# Patient Record
Sex: FEMALE | Race: WHITE | NOT HISPANIC OR LATINO
[De-identification: names, ages, dates, MRNs, and addresses within clinical notes are randomized per-mention and may not be internally consistent; named-entity substitution may affect disease eponyms.]

---

## 2017-08-17 ENCOUNTER — APPOINTMENT (OUTPATIENT)
Dept: SURGICAL ONCOLOGY | Facility: CLINIC | Age: 52
End: 2017-08-17
Payer: COMMERCIAL

## 2017-08-17 VITALS
HEIGHT: 62 IN | RESPIRATION RATE: 16 BRPM | DIASTOLIC BLOOD PRESSURE: 71 MMHG | HEART RATE: 66 BPM | SYSTOLIC BLOOD PRESSURE: 111 MMHG | TEMPERATURE: 98.1 F

## 2017-08-17 PROCEDURE — 99213 OFFICE O/P EST LOW 20 MIN: CPT

## 2019-03-15 PROBLEM — Z00.00 ENCOUNTER FOR PREVENTIVE HEALTH EXAMINATION: Noted: 2019-03-15

## 2019-03-26 ENCOUNTER — TRANSCRIPTION ENCOUNTER (OUTPATIENT)
Age: 54
End: 2019-03-26

## 2019-03-27 ENCOUNTER — OUTPATIENT (OUTPATIENT)
Dept: OUTPATIENT SERVICES | Facility: HOSPITAL | Age: 54
LOS: 1 days | End: 2019-03-27
Payer: COMMERCIAL

## 2019-03-27 ENCOUNTER — RESULT REVIEW (OUTPATIENT)
Age: 54
End: 2019-03-27

## 2019-03-27 DIAGNOSIS — Z12.11 ENCOUNTER FOR SCREENING FOR MALIGNANT NEOPLASM OF COLON: ICD-10-CM

## 2019-03-27 LAB — HCG UR QL: NEGATIVE — SIGNIFICANT CHANGE UP

## 2019-03-27 PROCEDURE — 45380 COLONOSCOPY AND BIOPSY: CPT | Mod: PT

## 2019-03-27 PROCEDURE — 88305 TISSUE EXAM BY PATHOLOGIST: CPT

## 2019-03-27 PROCEDURE — 88305 TISSUE EXAM BY PATHOLOGIST: CPT | Mod: 26

## 2019-03-27 PROCEDURE — 81025 URINE PREGNANCY TEST: CPT

## 2019-03-28 LAB — SURGICAL PATHOLOGY STUDY: SIGNIFICANT CHANGE UP

## 2019-05-20 ENCOUNTER — TRANSCRIPTION ENCOUNTER (OUTPATIENT)
Age: 54
End: 2019-05-20

## 2019-07-09 ENCOUNTER — TRANSCRIPTION ENCOUNTER (OUTPATIENT)
Age: 54
End: 2019-07-09

## 2019-08-22 ENCOUNTER — APPOINTMENT (OUTPATIENT)
Dept: SURGICAL ONCOLOGY | Facility: CLINIC | Age: 54
End: 2019-08-22
Payer: COMMERCIAL

## 2019-08-22 VITALS
BODY MASS INDEX: 22.26 KG/M2 | DIASTOLIC BLOOD PRESSURE: 67 MMHG | HEART RATE: 91 BPM | HEIGHT: 62 IN | SYSTOLIC BLOOD PRESSURE: 98 MMHG | WEIGHT: 121 LBS | RESPIRATION RATE: 15 BRPM

## 2019-08-22 PROCEDURE — 99215 OFFICE O/P EST HI 40 MIN: CPT

## 2019-08-22 NOTE — ASSESSMENT
[FreeTextEntry1] : August 2019:\par Bilateral mammogram and sonogram MAR: BI-RADS 2\par \par Prescription provided for August 2020.\par \par Clinically doing well.\par \par Due to her pacemaker, she cannot have breast MRIs.\par \par We will see her annually, sooner if needed

## 2019-08-22 NOTE — REASON FOR VISIT
[Follow-Up Visit] : a follow-up visit for [Other: _____] : [unfilled] [FreeTextEntry2] : Right breast atypia

## 2019-08-22 NOTE — PHYSICAL EXAM
[Normal] : supple, no neck mass and thyroid not enlarged [Normal Neck Lymph Nodes] : normal neck lymph nodes  [Normal Supraclavicular Lymph Nodes] : normal supraclavicular lymph nodes [Normal Axillary Lymph Nodes] : normal axillary lymph nodes [Normal] : full range of motion and no deformities appreciated [de-identified] : Groins not examined [de-identified] : below

## 2019-08-22 NOTE — REVIEW OF SYSTEMS
[Negative] : Endocrine [FreeTextEntry5] : Hypertension [de-identified] : TIA [FreeTextEntry1] : increased risk of breast cancer

## 2019-10-24 ENCOUNTER — TRANSCRIPTION ENCOUNTER (OUTPATIENT)
Age: 54
End: 2019-10-24

## 2020-08-31 ENCOUNTER — APPOINTMENT (OUTPATIENT)
Dept: SURGICAL ONCOLOGY | Facility: CLINIC | Age: 55
End: 2020-08-31
Payer: COMMERCIAL

## 2020-08-31 VITALS
RESPIRATION RATE: 15 BRPM | HEART RATE: 75 BPM | WEIGHT: 118 LBS | BODY MASS INDEX: 21.71 KG/M2 | DIASTOLIC BLOOD PRESSURE: 71 MMHG | OXYGEN SATURATION: 96 % | HEIGHT: 62 IN | SYSTOLIC BLOOD PRESSURE: 108 MMHG

## 2020-08-31 PROCEDURE — 99214 OFFICE O/P EST MOD 30 MIN: CPT

## 2020-09-10 NOTE — REVIEW OF SYSTEMS
[Negative] : Endocrine [FreeTextEntry5] : Pacemaker [FreeTextEntry6] : Asthma [FreeTextEntry1] : History of atypia, increased risk of breast cancer

## 2020-09-10 NOTE — PHYSICAL EXAM
[Normal] : supple, no neck mass and thyroid not enlarged [Normal Neck Lymph Nodes] : normal neck lymph nodes  [Normal Supraclavicular Lymph Nodes] : normal supraclavicular lymph nodes [Normal Axillary Lymph Nodes] : normal axillary lymph nodes [Normal] : normal appearance, no rash, nodules, vesicles, ulcers, erythema [de-identified] : Groins not examined [de-identified] : below

## 2020-09-10 NOTE — HISTORY OF PRESENT ILLNESS
[de-identified] : Maria Elena Armstrong's sister\par \par 55 year-old lady with a history of RIGHT BREAST ATYPIA and radial scar excised in 2007.\par \par She was on tamoxifen for risk reduction, until it was stopped because she had a TIA.\par \par 2010, right breast MRI core biopsy was benign and concordant.\par \par 2010 she had a left duct excision.\par \par Due to insurance reasons, she transiently saw my associate JW.  (–), \par \par \par +FH:\par His sister had breast cancer at 32.\par Another sister had ovarian cancer.\par \par NO deleterious mutation on genetic testing.\par \par \par Menarche at 12.\par  3, para 2, first birth at 21.\par \par Sangeetha score 39\par \par Breast MRI contraindicated due to PACEMAKER\par \par  she had an aortic valve replacement with a bovine prosthesis.\par Postoperatively she required a pacemaker placement.\par She takes aspirin daily.\par \par Her internist is Dr.Adalbert SHARIF\par Was Dr Greg Ackerman\par \par \par Cardizem for hypertension.\par Her cardiologist is Dr. Fernando SMITH\par \par Known benign thyroid nodules.\par 2020 FNA biopsy was benign.\par Endocrine: Dr. Benedict SIMON\par \par Meloxicam and ibuprofen.\par Otology: Dr. Pepito CARBAJAL\par \par \par Her gynecologist is Dr. Osiel HAMM, 2020 was normal\par \par \par Spring 2019 colonoscopy: Dr. Krish Corral

## 2020-09-10 NOTE — ASSESSMENT
[FreeTextEntry1] : 2019:\par Bilateral mammogram and sonogram MAR: BI-RADS 2\par \par 2020 breast imaging at [facility]: Results pendin20: BI-RADS 2\par Prescription given for 2021\par \par Clinically doing well.\par \par Due to her pacemaker, she cannot have breast MRIs.\par \par We will see her annually, sooner if needed

## 2021-09-02 ENCOUNTER — APPOINTMENT (OUTPATIENT)
Dept: SURGICAL ONCOLOGY | Facility: CLINIC | Age: 56
End: 2021-09-02
Payer: COMMERCIAL

## 2021-09-02 VITALS
HEART RATE: 74 BPM | BODY MASS INDEX: 21.71 KG/M2 | RESPIRATION RATE: 16 BRPM | OXYGEN SATURATION: 97 % | SYSTOLIC BLOOD PRESSURE: 125 MMHG | WEIGHT: 118 LBS | HEIGHT: 62 IN | DIASTOLIC BLOOD PRESSURE: 77 MMHG

## 2021-09-02 DIAGNOSIS — N60.99 UNSPECIFIED BENIGN MAMMARY DYSPLASIA OF UNSPECIFIED BREAST: ICD-10-CM

## 2021-09-02 PROCEDURE — 99213 OFFICE O/P EST LOW 20 MIN: CPT

## 2021-09-02 NOTE — HISTORY OF PRESENT ILLNESS
[de-identified] : Maria Elena Armstrong's sister\par \par 56 year-old lady with a history of RIGHT BREAST ATYPIA and radial scar excised in 2007.\par \par She was on tamoxifen for risk reduction, until it was stopped because she had a TIA.\par \par 2010, right breast MRI core biopsy was benign and concordant.\par \par 2010 she had a left duct excision.\par \par Due to insurance reasons, she transiently saw my associate JW.  (–), \par \par \par +FH:\par His sister had breast cancer at 32.\par Another sister had ovarian cancer.\par \par NO deleterious mutation on genetic testing.\par \par \par Menarche at 12.\par  3, para 2, first birth at 21.\par \par Sangeetha score 39\par \par Breast MRI contraindicated due to PACEMAKER\par \par  she had an aortic valve replacement with a bovine prosthesis.\par Postoperatively she required a pacemaker placement.\par She takes aspirin daily.\par \par \par Her internist is Dr.Kamal SWAN\par Was Dr Greg Ackerman, then Dr Cristina Rojas\par \par \par Cardizem for hypertension.\par Her cardiologist is Dr. Fernando SMITH\par \par Known benign thyroid nodules.\par 2020 FNA biopsy was benign.\par Endocrine: Dr. Benedict SIMON\par \par + Rheumatoid arthritis.\par Meloxicam and ibuprofen.\par Rheumatology: Dr. Pepito CARBAJAL.\par She also has scleroderma.\par She was transiently treated with hydroxychloroquine, which she had to stop because of heartburn.\par \par \par Her gynecologist is Dr. Osiel HAMM, 2020 was normal\par \par \par Spring 2019 colonoscopy: Dr. Krish Corral

## 2021-09-02 NOTE — ASSESSMENT
[FreeTextEntry1] : \par \par August 2021 breast imaging at [facility]: BI-RADS 2\par Prescription given for August 2022\par \par Clinically doing well.\par \par Due to her pacemaker, she cannot have breast MRIs.\par \par We will see her annually, sooner if needed

## 2021-09-02 NOTE — REVIEW OF SYSTEMS
[Negative] : Integumentary [FreeTextEntry5] : Hypertension [de-identified] : Osteoarthritis [de-identified] : Benign thyroid nodules [FreeTextEntry1] : Increased risk of breast cancer

## 2021-09-02 NOTE — PHYSICAL EXAM
[Normal] : supple, no neck mass and thyroid not enlarged [Normal Neck Lymph Nodes] : normal neck lymph nodes  [Normal Supraclavicular Lymph Nodes] : normal supraclavicular lymph nodes [Normal Axillary Lymph Nodes] : normal axillary lymph nodes [Normal] : normal appearance, no rash, nodules, vesicles, ulcers, erythema [de-identified] : Groins not examined [de-identified] : below

## 2021-09-02 NOTE — REASON FOR VISIT
[Other: _____] : [unfilled] [FreeTextEntry2] : Increased risk of breast cancer, history of right breast atypia

## 2021-11-04 ENCOUNTER — OUTPATIENT (OUTPATIENT)
Dept: OUTPATIENT SERVICES | Facility: HOSPITAL | Age: 56
LOS: 1 days | End: 2021-11-04
Payer: COMMERCIAL

## 2021-11-04 DIAGNOSIS — I35.0 NONRHEUMATIC AORTIC (VALVE) STENOSIS: ICD-10-CM

## 2021-11-04 PROCEDURE — 93306 TTE W/DOPPLER COMPLETE: CPT | Mod: 26

## 2021-11-04 PROCEDURE — 93306 TTE W/DOPPLER COMPLETE: CPT

## 2021-11-23 ENCOUNTER — APPOINTMENT (OUTPATIENT)
Dept: CARDIOTHORACIC SURGERY | Facility: CLINIC | Age: 56
End: 2021-11-23
Payer: COMMERCIAL

## 2021-12-09 ENCOUNTER — APPOINTMENT (OUTPATIENT)
Dept: CARDIOTHORACIC SURGERY | Facility: CLINIC | Age: 56
End: 2021-12-09
Payer: COMMERCIAL

## 2021-12-09 VITALS
HEIGHT: 62 IN | DIASTOLIC BLOOD PRESSURE: 99 MMHG | TEMPERATURE: 98.3 F | BODY MASS INDEX: 21.9 KG/M2 | HEART RATE: 65 BPM | WEIGHT: 119 LBS | OXYGEN SATURATION: 98 % | RESPIRATION RATE: 14 BRPM | SYSTOLIC BLOOD PRESSURE: 182 MMHG

## 2021-12-09 DIAGNOSIS — I35.9 NONRHEUMATIC AORTIC VALVE DISORDER, UNSPECIFIED: ICD-10-CM

## 2021-12-09 DIAGNOSIS — I50.9 HEART FAILURE, UNSPECIFIED: ICD-10-CM

## 2021-12-09 DIAGNOSIS — T82.09XA OTHER MECHANICAL COMPLICATION OF HEART VALVE PROSTHESIS, INITIAL ENCOUNTER: ICD-10-CM

## 2021-12-09 DIAGNOSIS — Z95.2 PRESENCE OF PROSTHETIC HEART VALVE: ICD-10-CM

## 2021-12-09 PROCEDURE — 99204 OFFICE O/P NEW MOD 45 MIN: CPT

## 2021-12-09 RX ORDER — TOBRAMYCIN AND DEXAMETHASONE 3; 1 MG/ML; MG/ML
0.3-0.1 SUSPENSION/ DROPS OPHTHALMIC
Qty: 5 | Refills: 0 | Status: COMPLETED | COMMUNITY
Start: 2017-07-10 | End: 2021-12-09

## 2021-12-09 RX ORDER — TRAMADOL HYDROCHLORIDE 50 MG/1
50 TABLET, COATED ORAL
Qty: 30 | Refills: 0 | Status: COMPLETED | COMMUNITY
Start: 2017-08-10 | End: 2021-12-09

## 2021-12-09 RX ORDER — AMOXICILLIN AND CLAVULANATE POTASSIUM 875; 125 MG/1; MG/1
875-125 TABLET, COATED ORAL
Qty: 14 | Refills: 0 | Status: COMPLETED | COMMUNITY
Start: 2017-07-10 | End: 2021-12-09

## 2021-12-09 RX ORDER — PANTOPRAZOLE 40 MG/1
40 TABLET, DELAYED RELEASE ORAL
Qty: 1 | Refills: 3 | Status: ACTIVE | COMMUNITY
Start: 2021-12-09

## 2021-12-09 RX ORDER — CYCLOBENZAPRINE HYDROCHLORIDE 5 MG/1
5 TABLET, FILM COATED ORAL
Qty: 63 | Refills: 0 | Status: COMPLETED | COMMUNITY
Start: 2017-07-13 | End: 2021-12-09

## 2021-12-09 RX ORDER — CHOLECALCIFEROL (VITAMIN D3) 50 MCG
50 MCG TABLET ORAL WEEKLY
Refills: 0 | Status: ACTIVE | COMMUNITY
Start: 2021-12-09

## 2021-12-09 RX ORDER — AMOXICILLIN 500 MG/1
500 CAPSULE ORAL
Qty: 40 | Refills: 0 | Status: COMPLETED | COMMUNITY
Start: 2017-03-22 | End: 2021-12-09

## 2021-12-09 RX ORDER — MELOXICAM 7.5 MG/1
7.5 TABLET ORAL
Qty: 30 | Refills: 0 | Status: COMPLETED | COMMUNITY
Start: 2017-02-18 | End: 2021-12-09

## 2021-12-09 RX ORDER — UREA 40 G/100G
40 CREAM TOPICAL
Qty: 85 | Refills: 0 | Status: COMPLETED | COMMUNITY
Start: 2017-07-08 | End: 2021-12-09

## 2021-12-09 RX ORDER — IBUPROFEN 800 MG/1
800 TABLET, FILM COATED ORAL
Qty: 63 | Refills: 0 | Status: COMPLETED | COMMUNITY
Start: 2017-07-13 | End: 2021-12-09

## 2021-12-09 NOTE — ASSESSMENT
[FreeTextEntry1] : Lesvia is a 56 year old female who presents for surgical consultation for degeneration of the aortic bioprosthesis with pGr 41 mmHg, mGr 21 mmHg, DVI=0.27, LAYNE 0.8 cm².  She underwent an AVR (# 21 mm bovine pericardial valve) and aortic valve replacement utilizing a 28 Hemashield platinum graft on 3/16/2012. She has been followed in our aortic registry since her original surgery with serial imaging. She is currently working as a dental hygienist. She reports dyspnea on exertion when walking up an incline. She denies chest pain, palpitations, PND, orthopnea or syncope. \par \par I have reviewed the patient's medical records, diagnostic images during the time of this office consultation and have made the following recommendation. Review of the imaging shows his aortic pathology does require surgical intervention. Will refer to Structural heart Team as patient does not want to pursue redo aortic valve surgery. She became clearly emotional when discussion of valve replacement with surgical replacement was discussed. \par \par Plan\par 1. CT Structural \par 2.Structural heart Team consultation \par 3. Follow up with cardiologist and PCP.\par \par

## 2021-12-09 NOTE — PHYSICAL EXAM
[General Appearance - Alert] : alert [General Appearance - In No Acute Distress] : in no acute distress [Outer Ear] : the ears and nose were normal in appearance [Jugular Venous Distention Increased] : there was no jugular-venous distention [] : no respiratory distress [Respiration, Rhythm And Depth] : normal respiratory rhythm and effort [Prosthetic Aortic Valve] : prosthetic aortic valve heard [Surgical / Traumatic Scar Sternum] : a scar [Bowel Sounds] : normal bowel sounds [Abdomen Soft] : soft [Cervical Lymph Nodes Enlarged Posterior Bilaterally] : posterior cervical [Cervical Lymph Nodes Enlarged Anterior Bilaterally] : anterior cervical [No CVA Tenderness] : no ~M costovertebral angle tenderness [No Focal Deficits] : no focal deficits [Oriented To Time, Place, And Person] : oriented to person, place, and time [Impaired Insight] : insight and judgment were intact [Right Carotid Bruit] : no bruit heard over the right carotid [Left Carotid Bruit] : no bruit heard over the left carotid [Abnormal Walk] : normal gait

## 2021-12-09 NOTE — CONSULT LETTER
[FreeTextEntry2] : Dr. Benedict Robert\par [address] [facility]\par [place] [id number] [FreeTextEntry3] : Sincere Rowe MD\par  & \par \par Cardiovascular & Thoracic Surgery\par [facility] \par [address]\par [facility] [address]\par

## 2021-12-09 NOTE — HISTORY OF PRESENT ILLNESS
[FreeTextEntry1] : Lesvia is a 56 year old female who presents for surgical consultation for degeneration of the aortic bioprosthesis with pGr 41 mmHg, mGr 21 mmHg, DVI=0.27, LAYNE 0.8 cm².  She underwent an AVR (# 21 mm bovine pericardial valve) and aortic valve replacement utilizing a 28 Hemashield platinum graft on 3/16/2012. She is currently working as a dental hygienist. She reports dyspnea on exertion when walking up an incline. She denies chest pain, palpitations, PND, orthopnea or syncope.

## 2021-12-09 NOTE — REVIEW OF SYSTEMS
[Feeling Tired] : feeling tired [SOB on Exertion] : shortness of breath during exertion [Joint Stiffness] : joint stiffness [Negative] : Heme/Lymph [Chest Pain] : no chest pain [Palpitations] : no palpitations [Lower Ext Edema] : no lower extremity edema [Anxiety] : no anxiety

## 2021-12-21 ENCOUNTER — APPOINTMENT (OUTPATIENT)
Dept: CARDIOLOGY | Facility: CLINIC | Age: 56
End: 2021-12-21

## 2021-12-21 ENCOUNTER — RESULT REVIEW (OUTPATIENT)
Age: 56
End: 2021-12-21

## 2021-12-21 ENCOUNTER — OUTPATIENT (OUTPATIENT)
Dept: OUTPATIENT SERVICES | Facility: HOSPITAL | Age: 56
LOS: 1 days | End: 2021-12-21
Payer: COMMERCIAL

## 2021-12-21 DIAGNOSIS — T82.09XA OTHER MECHANICAL COMPLICATION OF HEART VALVE PROSTHESIS, INITIAL ENCOUNTER: ICD-10-CM

## 2021-12-21 DIAGNOSIS — Z00.00 ENCOUNTER FOR GENERAL ADULT MEDICAL EXAMINATION WITHOUT ABNORMAL FINDINGS: ICD-10-CM

## 2021-12-21 PROCEDURE — 75572 CT HRT W/3D IMAGE: CPT

## 2021-12-21 PROCEDURE — 75572 CT HRT W/3D IMAGE: CPT | Mod: 26

## 2022-01-04 LAB
ALBUMIN SERPL ELPH-MCNC: 4.9 G/DL
ALP BLD-CCNC: 94 U/L
ALT SERPL-CCNC: 32 U/L
ANION GAP SERPL CALC-SCNC: 13 MMOL/L
AST SERPL-CCNC: 22 U/L
BASOPHILS # BLD AUTO: 0.04 K/UL
BASOPHILS NFR BLD AUTO: 0.4 %
BILIRUB SERPL-MCNC: 0.5 MG/DL
BUN SERPL-MCNC: 12 MG/DL
CALCIUM SERPL-MCNC: 10.1 MG/DL
CHLORIDE SERPL-SCNC: 105 MMOL/L
CO2 SERPL-SCNC: 23 MMOL/L
CREAT SERPL-MCNC: 0.63 MG/DL
EOSINOPHIL # BLD AUTO: 0.08 K/UL
EOSINOPHIL NFR BLD AUTO: 0.9 %
GLUCOSE SERPL-MCNC: 86 MG/DL
HCT VFR BLD CALC: 49.5 %
HGB BLD-MCNC: 16.4 G/DL
IMM GRANULOCYTES NFR BLD AUTO: 0.2 %
LYMPHOCYTES # BLD AUTO: 2.46 K/UL
LYMPHOCYTES NFR BLD AUTO: 26.5 %
MAN DIFF?: NORMAL
MCHC RBC-ENTMCNC: 29.8 PG
MCHC RBC-ENTMCNC: 33.1 GM/DL
MCV RBC AUTO: 90 FL
MONOCYTES # BLD AUTO: 0.66 K/UL
MONOCYTES NFR BLD AUTO: 7.1 %
NEUTROPHILS # BLD AUTO: 6.03 K/UL
NEUTROPHILS NFR BLD AUTO: 64.9 %
NT-PROBNP SERPL-MCNC: 388 PG/ML
PLATELET # BLD AUTO: 246 K/UL
POTASSIUM SERPL-SCNC: 4.1 MMOL/L
PROT SERPL-MCNC: 6.9 G/DL
RBC # BLD: 5.5 M/UL
RBC # FLD: 12.5 %
SODIUM SERPL-SCNC: 141 MMOL/L
WBC # FLD AUTO: 9.29 K/UL

## 2022-01-07 ENCOUNTER — NON-APPOINTMENT (OUTPATIENT)
Age: 57
End: 2022-01-07

## 2022-01-09 ENCOUNTER — OUTPATIENT (OUTPATIENT)
Dept: OUTPATIENT SERVICES | Facility: HOSPITAL | Age: 57
LOS: 1 days | End: 2022-01-09
Payer: COMMERCIAL

## 2022-01-09 DIAGNOSIS — Z11.52 ENCOUNTER FOR SCREENING FOR COVID-19: ICD-10-CM

## 2022-01-09 PROCEDURE — U0003: CPT

## 2022-01-09 PROCEDURE — U0005: CPT

## 2022-01-09 PROCEDURE — C9803: CPT

## 2022-01-10 LAB — SARS-COV-2 RNA SPEC QL NAA+PROBE: DETECTED

## 2022-01-14 ENCOUNTER — OUTPATIENT (OUTPATIENT)
Dept: OUTPATIENT SERVICES | Facility: HOSPITAL | Age: 57
LOS: 1 days | End: 2022-01-14
Payer: COMMERCIAL

## 2022-01-14 VITALS
TEMPERATURE: 98 F | HEART RATE: 65 BPM | RESPIRATION RATE: 17 BRPM | HEIGHT: 62 IN | DIASTOLIC BLOOD PRESSURE: 77 MMHG | WEIGHT: 123.02 LBS | OXYGEN SATURATION: 97 % | SYSTOLIC BLOOD PRESSURE: 121 MMHG

## 2022-01-14 DIAGNOSIS — Z98.890 OTHER SPECIFIED POSTPROCEDURAL STATES: Chronic | ICD-10-CM

## 2022-01-14 DIAGNOSIS — T82.110A BREAKDOWN (MECHANICAL) OF CARDIAC ELECTRODE, INITIAL ENCOUNTER: ICD-10-CM

## 2022-01-14 DIAGNOSIS — U07.1 COVID-19: ICD-10-CM

## 2022-01-14 DIAGNOSIS — Z98.891 HISTORY OF UTERINE SCAR FROM PREVIOUS SURGERY: Chronic | ICD-10-CM

## 2022-01-14 DIAGNOSIS — Z86.79 PERSONAL HISTORY OF OTHER DISEASES OF THE CIRCULATORY SYSTEM: ICD-10-CM

## 2022-01-14 DIAGNOSIS — Z95.2 PRESENCE OF PROSTHETIC HEART VALVE: Chronic | ICD-10-CM

## 2022-01-14 DIAGNOSIS — Z01.818 ENCOUNTER FOR OTHER PREPROCEDURAL EXAMINATION: ICD-10-CM

## 2022-01-14 DIAGNOSIS — Z90.89 ACQUIRED ABSENCE OF OTHER ORGANS: Chronic | ICD-10-CM

## 2022-01-14 DIAGNOSIS — I44.2 ATRIOVENTRICULAR BLOCK, COMPLETE: ICD-10-CM

## 2022-01-14 DIAGNOSIS — O02.1 MISSED ABORTION: Chronic | ICD-10-CM

## 2022-01-14 PROCEDURE — 86850 RBC ANTIBODY SCREEN: CPT

## 2022-01-14 PROCEDURE — 71046 X-RAY EXAM CHEST 2 VIEWS: CPT | Mod: 26

## 2022-01-14 PROCEDURE — 83036 HEMOGLOBIN GLYCOSYLATED A1C: CPT

## 2022-01-14 PROCEDURE — G0463: CPT

## 2022-01-14 PROCEDURE — 80048 BASIC METABOLIC PNL TOTAL CA: CPT

## 2022-01-14 PROCEDURE — 86901 BLOOD TYPING SEROLOGIC RH(D): CPT

## 2022-01-14 PROCEDURE — 87641 MR-STAPH DNA AMP PROBE: CPT

## 2022-01-14 PROCEDURE — 85027 COMPLETE CBC AUTOMATED: CPT

## 2022-01-14 PROCEDURE — 71046 X-RAY EXAM CHEST 2 VIEWS: CPT

## 2022-01-14 PROCEDURE — 87640 STAPH A DNA AMP PROBE: CPT

## 2022-01-14 PROCEDURE — 36415 COLL VENOUS BLD VENIPUNCTURE: CPT

## 2022-01-14 PROCEDURE — 86900 BLOOD TYPING SEROLOGIC ABO: CPT

## 2022-01-14 RX ORDER — CHLORHEXIDINE GLUCONATE 213 G/1000ML
1 SOLUTION TOPICAL ONCE
Refills: 0 | Status: DISCONTINUED | OUTPATIENT
Start: 2022-01-18 | End: 2022-01-19

## 2022-01-14 RX ORDER — CEFUROXIME AXETIL 250 MG
1500 TABLET ORAL ONCE
Refills: 0 | Status: DISCONTINUED | OUTPATIENT
Start: 2022-01-18 | End: 2022-01-19

## 2022-01-14 RX ORDER — SODIUM CHLORIDE 9 MG/ML
3 INJECTION INTRAMUSCULAR; INTRAVENOUS; SUBCUTANEOUS EVERY 8 HOURS
Refills: 0 | Status: DISCONTINUED | OUTPATIENT
Start: 2022-01-18 | End: 2022-01-19

## 2022-01-14 RX ORDER — LIDOCAINE HCL 20 MG/ML
0.2 VIAL (ML) INJECTION ONCE
Refills: 0 | Status: DISCONTINUED | OUTPATIENT
Start: 2022-01-18 | End: 2022-01-19

## 2022-01-14 RX ORDER — APREPITANT 80 MG/1
40 CAPSULE ORAL ONCE
Refills: 0 | Status: COMPLETED | OUTPATIENT
Start: 2022-01-18 | End: 2022-01-18

## 2022-01-14 NOTE — H&P PST ADULT - NSANTHOSAYNRD_GEN_A_CORE
No. VIV screening performed.  STOP BANG Legend: 0-2 = LOW Risk; 3-4 = INTERMEDIATE Risk; 5-8 = HIGH Risk

## 2022-01-14 NOTE — H&P PST ADULT - FALL HARM RISK - UNIVERSAL INTERVENTIONS
Bed in lowest position, wheels locked, appropriate side rails in place/Call bell, personal items and telephone in reach/Instruct patient to call for assistance before getting out of bed or chair/Non-slip footwear when patient is out of bed/[place] to call system/Physically safe environment - no spills, clutter or unnecessary equipment/Purposeful Proactive Rounding/Room/bathroom lighting operational, light cord in reach

## 2022-01-14 NOTE — H&P PST ADULT - PROBLEM SELECTOR PLAN 3
griselda 1/9/2022  home quarantine   released from quarantine by VLADIMIR  scheduled 1/18/2022 for surgery   email sent to surgeon and infectious disease   pre op swab 1/16 Polly

## 2022-01-14 NOTE — H&P PST ADULT - ATTENDING COMMENTS
please see telehealth note for details of discussion. Had repeated conversation reviewing all options, procedures and risks. The patient wishes to proceed with extraction and MICRA placement

## 2022-01-14 NOTE — H&P PST ADULT - NSICDXPASTMEDICALHX_GEN_ALL_CORE_FT
PAST MEDICAL HISTORY:  2019 novel coronavirus disease (COVID-19) 1/9/2021 + PCR  s/s " like a cold"  lasting 4 days  tx  Z Pac  singulair cough syrup    Cardiac pacemaker March 2021   check 2  weeks leads need to be change    Chronic GERD     H/O rheumatoid arthritis not medication    Severe aortic valve stenosis      PAST MEDICAL HISTORY:  2019 novel coronavirus disease (COVID-19) 1/9/2021 + PCR  s/s " like a cold"  lasting 4 days  tx  Z Pac  singulair cough syrup    Cardiac pacemaker March 2021   check 2  weeks leads need to be change    Chronic GERD     H/O rheumatoid arthritis not medication    Severe aortic valve stenosis aortic valve disease dx in childhood had valve replaced 10 years ago   now needs TAVR    Tricuspid valve regurgitation      PAST MEDICAL HISTORY:  2019 novel coronavirus disease (COVID-19) 1/9/2021 + PCR  s/s " like a cold"  lasting 4 days  tx  Z Pac  singulair cough syrup    Breast ductal hyperplasia, atypical     Cardiac pacemaker March 2021   check 2  weeks leads need to be change    Chronic GERD     H/O rheumatoid arthritis not  on medication    Severe aortic valve stenosis aortic valve disease dx in childhood had valve replaced 10 years ago   now needs TAVR    Tricuspid valve regurgitation

## 2022-01-14 NOTE — H&P PST ADULT - NSICDXPASTSURGICALHX_GEN_ALL_CORE_FT
PAST SURGICAL HISTORY:  H/O breast biopsy multiple    H/O  section 1991    Missed ab d and c    S/p bilateral carpal tunnel release 3 years    S/P cardiac cath 2012    S/P tonsillectomy childhood     PAST SURGICAL HISTORY:  H/O aortic aneurysm repair 10 years ago    H/O aortic valve replacement 10 years ago bovine    H/O breast biopsy multiple    H/O  section 1991    Missed ab d and c    S/p bilateral carpal tunnel release 3 years    S/P cardiac cath 2012    S/P tonsillectomy childhood     PAST SURGICAL HISTORY:  H/O aortic aneurysm repair 10 years ago    H/O aortic valve replacement 10 years ago bovine    H/O breast biopsy multiple negative for cancer per pt    H/O  section 1991    Missed ab d and c    S/p bilateral carpal tunnel release 3 years    S/P cardiac cath 2012    S/P tonsillectomy childhood

## 2022-01-14 NOTE — H&P PST ADULT - MAMMOGRAM, RESULTS OF LAST, PROFILE
ASSESSMENT & PLAN    No problem-specific Assessment & Plan notes found for this encounter.      Lucy was seen today for adhd.    Diagnoses and all orders for this visit:    Attention deficit disorder, unspecified hyperactivity presence  -     Drug Abuse 1+, Urine    Mild episode of recurrent major depressive disorder (H)    Morbid obesity (H)    Attention deficit hyperactivity disorder (ADHD), predominantly inattentive type  -     dextroamphetamine-amphetamine (ADDERALL XR) 30 MG 24 hr capsule; Take 1 capsule (30 mg total) by mouth every morning.  -     dextroamphetamine-amphetamine (ADDERALL XR) 30 MG 24 hr capsule; Take 1 capsule (30 mg total) by mouth every morning.  -     dextroamphetamine-amphetamine (ADDERALL XR) 30 MG 24 hr capsule; Take 1 capsule (30 mg total) by mouth every morning.    Hypothyroidism, unspecified type  -     T4, Free  -     T4, Total  -     Thyroid Stimulating Hormone (TSH)  -     thyroid, pork, 30 mg Tab; Take 1 tablet (30 mg total) by mouth daily.    Thyroid nodule  -     US Thyroid; Future  -     thyroid, pork, 30 mg Tab; Take 1 tablet (30 mg total) by mouth daily.        Patient Instructions   [email or web address]  For coffee    8 hour eating window  16 hour fast window    Will plan to go to 30 mg [place]     Adderall sent       Instructions for Patients  Additional General Information About Corona Virus 19    Coronavirus - General Information:  The coronavirus infection starts within 14 days of an exposure.  Symptoms are those of a respiratory infection (such as fever, cough).   If you have not had symptoms by day 15, you should be safe from getting the coronavirus.     Coronavirus - Symptoms:   The coronavirus can cause a respiratory illness, such as bronchitis or pneumonia.  The most common symptoms are: cough, fever, and shortness of breath.   Other symptoms are: body aches, chills, diarrhea, fatigue, headache, runny nose, and sore throat      Coronavirus - Exposure Risk Factors:  Exposure to a person who has been diagnosed with coronavirus.  Travel from an area with recent local transmission of coronavirus.  The CDC (www.cdc.gov) has the most up-to-date list of where the coronavirus outbreak is occurring.    Coronavirus - Spreading:   The virus likely spreads through respiratory droplets produced when a person coughs or sneezes. These respiratory droplets can travel approximately 6 feet and can remain on surfaces.  Common disinfectants will kill the virus.  The CDC currently does not recommend healthy people wearing masks.    Coronavirus - Protect Yourself:   Avoid close contact with people known to have this new coronavirus infection.  Wash hands often with soap and water or alcohol-based hand .  Avoid touching the eyes, nose or mouth.     Thank you for limiting contact with others, wearing a simple mask to cover your cough, practice good hand hygiene habits and accessing our virtual services where possible to limit the spread of this virus.    For more information about COVID19 and options for caring for yourself at home, please visit the CDC website at https://www.cdc.gov/coronavirus/2019-ncov/about/steps-when-sick.html  For more options for care at [facility], please visit our website at [email or web address]     Read about corona Updates  Prepare by being informed  Stay safe and well    DanL            Return in about 6 months (around 9/12/2020).            CHIEF COMPLAINT: Lucyerum Kaur had concerns including ADHD (Medication check.).    [place]: 1.............. had concerns including ADHD (Medication check.).    1. Attention deficit disorder, unspecified hyperactivity presence    2. Mild episode of recurrent major depressive disorder (H)    3. Morbid obesity (H)    4. Attention deficit hyperactivity disorder (ADHD), predominantly inattentive type    5. Hypothyroidism, unspecified type    6.  Thyroid nodule          CC:             Why are you here today?                                   Medication Check - Adderall    Is it getting better / worse /same ?                                         N/a  WHAT IS IT LIKE in one word?:                                            n/a  HOW LONG is it ongoing: days, weeks,months?:                 n/a  What is it WORSE WITH activity? Eating? Movement? :      n/a  What makes it BETTER?:                                                      n/a  Severity:  0/10-10/10:Pain/Intesity                                         n/a      Is this NEW or Recurrent/Continued?                                    Recurrent        Patient is doing well  Last TSH 7.73  No side effects of medication    Continues use Adderall no side effects  Works from 7 PM to 7 AM  Has difficulty with meal prep    Asthma control test today 16  Currently on Flovent, cetirizine, Flonase and and albuterol    No hospitalizations    Declined a flu shot    Review of systems a recent lower gastrointestinal tract viral infection has gotten better          SUBJECTIVE:  Lucy Kaur is a 30 y.o. female                                SOCIAL: She  reports that she has quit smoking. She smoked 0.25 packs per day. She has never used smokeless tobacco. She reports that she does not drink alcohol or use drugs.    REVIEW OF SYSTEMS:   Family history not pertinent to chief complaint or presenting problem    Review of Systems:      Nervous System:  No new or change in headache, paresthesia or tremor                                  Ears: No new hearing loss or ringing in the ears    Eyes: No new blurring of vision, Double Vision                Nose: No new nosebleed or loss of smell    Mouth: No new mouth sores or  coated tongue    Throat: No new hoarseness or difficulty swallowing    Neck: No new neck pain or mass    Heart: No new chest pain, palpitation or irregular heartbeat.                  Lungs: No new  "shortness of breath, wheezing or hemoptysis.    Gastrointestinal: No new nausea or vomiting, melena or blood in stools.    Kidney/Bladder: No new polyuria, polydipsia, or hematuria.                             Genital/Sexual: No new Sex function Changes                                Skin: No new rash    Muscles/Joints/Bones: No changes in muscles / joint swelling     Review of systems otherwise negative as requested from patient, except   Those positive ROS outlined and discussed in [place].      VITALS:      Physical Exam:  TMs are clear  Oropharynx clear  Nasal mucosa is mildly congested  Thyroid palpable no appreciable nodules  She has some firmness bilateral lobes  Mild thyroid nodule  Unchanged in size  Reviewed her ultrasound  8 mm thyroid nodules in 2019  Follow yearly    Recent Results (from the past 240 hour(s))   T4, Free   Result Value Ref Range    Free T4 0.6 (L) 0.7 - 1.8 ng/dL   T4, Total   Result Value Ref Range    T4, Total 4.2 (L) 4.5 - 13.0 ug/dL   Thyroid Stimulating Hormone (TSH)   Result Value Ref Range    TSH 19.90 (H) 0.30 - 5.00 uIU/mL   Drug Abuse 1+, Urine   Result Value Ref Range    Amphetamines Screen Negative Screen Negative    Benzodiazepines Screen Negative Screen Negative    Opiates Screen Negative Screen Negative    Phencyclidine Screen Negative Screen Negative    THC Screen Negative Screen Negative    Barbiturates Screen Negative Screen Negative    Cocaine Metabolite Screen Negative Screen Negative    Methadone Screen Negative Screen Negative    Oxycodone Screen Negative Screen Negative    Creatinine, Urine 22.8 mg/dL           Vitals:    03/12/20 1357   BP: 120/78   Patient Site: Left Arm   Patient Position: Sitting   Cuff Size: Adult Large   Pulse: 76   Weight: (!) 237 lb (107.5 kg)   Height: 5' 2\" (1.575 m)     Wt Readings from Last 3 Encounters:   03/12/20 (!) 237 lb (107.5 kg)   12/05/19 (!) 246 lb (111.6 kg)   11/30/19 (!) 230 lb (104.3 kg)     Body mass index is 43.35 " kg/m .    PFSH:    Social History     Tobacco Use   Smoking Status Former Smoker     Packs/day: 0.25   Smokeless Tobacco Never Used   Tobacco Comment    quit 6 months ago       Family History   Problem Relation Age of Onset     Mental illness Mother      Asthma Father        Social History     Socioeconomic History     Marital status: Single     Spouse name: Not on file     Number of children: Not on file     Years of education: Not on file     Highest education level: Not on file   Occupational History     Not on file   Social Needs     Financial resource strain: Not on file     Food insecurity     Worry: Not on file     Inability: Not on file     Transportation needs     Medical: Not on file     Non-medical: Not on file   Tobacco Use     Smoking status: Former Smoker     Packs/day: 0.25     Smokeless tobacco: Never Used     Tobacco comment: quit 6 months ago   Substance and Sexual Activity     Alcohol use: No     Drug use: No     Sexual activity: Yes     Partners: Male     Birth control/protection: None, I.U.D.   Lifestyle     Physical activity     Days per week: Not on file     Minutes per session: Not on file     Stress: Not on file   Relationships     Social connections     Talks on phone: Not on file     Gets together: Not on file     Attends [employer] service: Not on file     Active member of club or organization: Not on file     Attends meetings of clubs or organizations: Not on file     Relationship status: Not on file     Intimate partner violence     Fear of current or ex partner: Not on file     Emotionally abused: Not on file     Physically abused: Not on file     Forced sexual activity: Not on file   Other Topics Concern     Not on file   Social History Narrative     Not on file       Past Surgical History:   Procedure Laterality Date     FOOT SURGERY Bilateral      IA REMOVAL GALLBLADDER      Description: Cholecystectomy;  Recorded: 05/26/2011;     TONSILLECTOMY AND ADENOIDECTOMY      AGE 8        No  Known Allergies    Active Ambulatory Problems     Diagnosis Date Noted     Hypothyroidism  [place]imotos  ELevated TPO       Anxiety Disorder NOS      Abnormal Pap smear of cervix      ADHD, Predominantly Inattentive Type      Asthma      Arthralgias In Multiple Sites      Difficulty Breathing (Dyspnea)      Allergies      Chronic Major Depression      Obesity      Carpal tunnel syndrome on both sides 03/16/2015     IUD contraception 03/18/2019     Morbid obesity (H) 12/05/2019     Resolved Ambulatory Problems     Diagnosis Date Noted     Acute pharyngitis      Pain During Urination (Dysuria)      Nicotine Dependence      Speech Phonation Hoarse      No Additional Past Medical History         MEDICATIONS:  Current Outpatient Medications   Medication Sig Dispense Refill     albuterol (PROAIR HFA;PROVENTIL HFA;VENTOLIN HFA) 90 mcg/actuation inhaler Inhale 2 puffs every 4 (four) hours as needed for wheezing. 1 Inhaler 0     albuterol (PROVENTIL) 2.5 mg /3 mL (0.083 %) nebulizer solution Take 3 mL (2.5 mg total) by nebulization every 6 (six) hours as needed for wheezing. 75 mL 0     budesonide-formoterol (SYMBICORT) 160-4.5 mcg/actuation inhaler INHALE 2 PUFFS BY MOUTH TWICE DAILY 3 Inhaler 3     cetirizine (ZYRTEC) 10 MG tablet Take 10 mg by mouth daily.       dextroamphetamine-amphetamine (ADDERALL XR) 30 MG 24 hr capsule Take 1 capsule (30 mg total) by mouth every morning. 30 capsule 0     dextroamphetamine-amphetamine (ADDERALL XR) 30 MG 24 hr capsule Take 1 capsule (30 mg total) by mouth every morning. 30 capsule 0     [START ON 4/9/2020] dextroamphetamine-amphetamine (ADDERALL XR) 30 MG 24 hr capsule Take 1 capsule (30 mg total) by mouth every morning. 30 capsule 0     [START ON 5/6/2020] dextroamphetamine-amphetamine (ADDERALL XR) 30 MG 24 hr capsule Take 1 capsule (30 mg total) by mouth every morning. 30 capsule 0     fluticasone (FLONASE) 50 mcg/actuation nasal spray 1-2 sprays in each nostril at bedtime. 16 g 0      fluticasone propionate (FLOVENT HFA) 110 mcg/actuation inhaler Inhale 1 puff 2 (two) times a day. 1 Inhaler 0     ibuprofen (ADVIL,MOTRIN) 600 MG tablet Take 1 tablet (600 mg total) by mouth 3 (three) times a day. 270 tablet 0     montelukast (SINGULAIR) 10 mg tablet Take 1 tablet (10 mg total) by mouth daily. 90 tablet 3     nebulizer accessories Misc Adult mask kit. Use as directed       adapalene (DIFFERIN) 0.1 % cream Apply to affected area nightly 45 g 1     thyroid, pork, 30 mg Tab Take 1 tablet (30 mg total) by mouth daily. 90 tablet 0     No current facility-administered medications for this visit.               I spent 25 minutes with this patient face to face, of which 50% or greater was spent in counseling and coordination of care with regards to Lucy was seen today for adhd.    Diagnoses and all orders for this visit:    Attention deficit disorder, unspecified hyperactivity presence  -     Drug Abuse 1+, Urine    Mild episode of recurrent major depressive disorder (H)    Morbid obesity (H)    Attention deficit hyperactivity disorder (ADHD), predominantly inattentive type  -     dextroamphetamine-amphetamine (ADDERALL XR) 30 MG 24 hr capsule; Take 1 capsule (30 mg total) by mouth every morning.  -     dextroamphetamine-amphetamine (ADDERALL XR) 30 MG 24 hr capsule; Take 1 capsule (30 mg total) by mouth every morning.  -     dextroamphetamine-amphetamine (ADDERALL XR) 30 MG 24 hr capsule; Take 1 capsule (30 mg total) by mouth every morning.    Hypothyroidism, unspecified type  -     T4, Free  -     T4, Total  -     Thyroid Stimulating Hormone (TSH)  -     thyroid, pork, 30 mg Tab; Take 1 tablet (30 mg total) by mouth daily.    Thyroid nodule  -     US Thyroid; Future  -     thyroid, pork, 30 mg Tab; Take 1 tablet (30 mg total) by mouth daily.        Oseas Foster MD  [facility] [address] [phone]     normal

## 2022-01-14 NOTE — H&P PST ADULT - ASSESSMENT
CAPRINI SCORE [CLOT]    AGE RELATED RISK FACTORS                                                       MOBILITY RELATED FACTORS  [ ] Age 41-60 years                                            (1 Point)                  [ ] Bed rest                                                        (1 Point)  [ ] Age: 61-74 years                                           (2 Points)                 [ ] Plaster cast                                                   (2 Points)  [ ] Age= 75 years                                              (3 Points)                 [ ] Bed bound for more than 72 hours                 (2 Points)    DISEASE RELATED RISK FACTORS                                               GENDER SPECIFIC FACTORS  [ ] Edema in the lower extremities                       (1 Point)                  [ ] Pregnancy                                                     (1 Point)  [ ] Varicose veins                                               (1 Point)                  [ ] Post-partum < 6 weeks                                   (1 Point)             [ ] BMI > 25 Kg/m2                                            (1 Point)                  [ ] Hormonal therapy  or oral contraception          (1 Point)                 [ ] Sepsis (in the previous month)                        (1 Point)                  [ ] History of pregnancy complications                 (1 point)  [ ] Pneumonia or serious lung disease                                               [ ] Unexplained or recurrent                     (1 Point)           (in the previous month)                               (1 Point)  [ ] Abnormal pulmonary function test                     (1 Point)                 SURGERY RELATED RISK FACTORS  [ ] Acute myocardial infarction                              (1 Point)                 [ ]  Section                                             (1 Point)  [ ] Congestive heart failure (in the previous month)  (1 Point)               [ ] Minor surgery                                                  (1 Point)   [ ] Inflammatory bowel disease                             (1 Point)                 [ ] Arthroscopic surgery                                        (2 Points)  [ ] Central venous access                                      (2 Points)                [x ] General surgery lasting more than 45 minutes   (2 Points)       [ ] Stroke (in the previous month)                          (5 Points)               [ ] Elective arthroplasty                                         (5 Points)                                                                                                                                               HEMATOLOGY RELATED FACTORS                                                 TRAUMA RELATED RISK FACTORS  [ ] Prior episodes of VTE                                     (3 Points)                [ ] Fracture of the hip, pelvis, or leg                       (5 Points)  [ ] Positive family history for VTE                         (3 Points)                 [ ] Acute spinal cord injury (in the previous month)  (5 Points)  [ ] Prothrombin 12753 A                                     (3 Points)                 [ ] Paralysis  (less than 1 month)                             (5 Points)  [ ] Factor V Leiden                                             (3 Points)                  [ ] Multiple Trauma within 1 month                        (5 Points)  [ ] Lupus anticoagulants                                     (3 Points)                                                           [ ] Anticardiolipin antibodies                               (3 Points)                                                       [ ] High homocysteine in the blood                      (3 Points)                                             [ ] Other congenital or acquired thrombophilia      (3 Points)                                                [ ] Heparin induced thrombocytopenia                  (3 Points)                                          Total Score [        2  ]    Caprini Score 0 - 2:  Low Risk, No VTE Prophylaxis required for most patients, encourage ambulation  Caprini Score 3 - 6:  At Risk, pharmacologic VTE prophylaxis is indicated for most patients (in the absence of a contraindication)  Caprini Score Greater than or = 7:  High Risk, pharmacologic VTE prophylaxis is indicated for most patients (in the absence of a contraindication)

## 2022-01-14 NOTE — H&P PST ADULT - HISTORY OF PRESENT ILLNESS
COVID 56 yr old female with hx of Aortic Valve disease since childhood.  Had valve and aortic aneurysm repair 10 years ago. In 2012 required cardiac pacemaker placement.  Last evaluation of pacemaker "need new leads and new device placed. " also need aortic valve repair. Will have pacemaker leads  extracted LUIS FELIPE AICD  placed 1/18/22 . TAVR to be scheduled    *****  COVID*****  POSITIVE  PCR 1/9/2022 mild symptoms per patient  home quarantine tx [place], cough meds and Singulair release from quarantine  by VLADIMIR per pt for PST  today . Currently asymptomatic email sent  to surgeon and infectious disease.  vaccine Pfizer 12/23/2020, 1/13/2021 booster 9/28/2021     swab pre op for pacemaker 1/16/2022  Polly  56 yr old female with hx of Aortic Valve disease since childhood.  Had valve and aortic aneurysm repair 10 years ago. In 2012 required cardiac pacemaker placement.  Last evaluation of pacemaker "need new leads and new device placed. " also need aortic valve repair. Will have pacemaker leads  extracted LUIS FELIPE AICD  placed 1/18/22 . TAVR to be scheduled    *****  COVID*****  POSITIVE  PCR 1/9/2022 mild symptoms per patient  home quarantine tx Z Pac, cough meds and Singulair release from quarantine  by VLADIMIR per pt for PST  today . Currently asymptomatic email sent  to surgeon and infectious disease.  vaccine Pfizer 12/23/2020, 1/13/2021 booster 9/28/2021     swab pre op for pacemaker 1/16/2022  Polly

## 2022-01-16 ENCOUNTER — OUTPATIENT (OUTPATIENT)
Dept: OUTPATIENT SERVICES | Facility: HOSPITAL | Age: 57
LOS: 1 days | End: 2022-01-16
Payer: COMMERCIAL

## 2022-01-16 DIAGNOSIS — Z98.890 OTHER SPECIFIED POSTPROCEDURAL STATES: Chronic | ICD-10-CM

## 2022-01-16 DIAGNOSIS — O02.1 MISSED ABORTION: Chronic | ICD-10-CM

## 2022-01-16 DIAGNOSIS — Z95.2 PRESENCE OF PROSTHETIC HEART VALVE: Chronic | ICD-10-CM

## 2022-01-16 DIAGNOSIS — Z98.891 HISTORY OF UTERINE SCAR FROM PREVIOUS SURGERY: Chronic | ICD-10-CM

## 2022-01-16 DIAGNOSIS — Z90.89 ACQUIRED ABSENCE OF OTHER ORGANS: Chronic | ICD-10-CM

## 2022-01-16 DIAGNOSIS — Z11.52 ENCOUNTER FOR SCREENING FOR COVID-19: ICD-10-CM

## 2022-01-16 LAB — SARS-COV-2 RNA SPEC QL NAA+PROBE: DETECTED

## 2022-01-16 PROCEDURE — U0003: CPT

## 2022-01-16 PROCEDURE — U0005: CPT

## 2022-01-16 PROCEDURE — C9803: CPT

## 2022-01-18 ENCOUNTER — OUTPATIENT (OUTPATIENT)
Dept: INPATIENT UNIT | Facility: HOSPITAL | Age: 57
LOS: 1 days | End: 2022-01-18
Payer: COMMERCIAL

## 2022-01-18 VITALS
WEIGHT: 123.02 LBS | RESPIRATION RATE: 17 BRPM | DIASTOLIC BLOOD PRESSURE: 77 MMHG | HEIGHT: 62 IN | OXYGEN SATURATION: 98 % | SYSTOLIC BLOOD PRESSURE: 129 MMHG | HEART RATE: 65 BPM | TEMPERATURE: 98 F

## 2022-01-18 DIAGNOSIS — Z95.2 PRESENCE OF PROSTHETIC HEART VALVE: Chronic | ICD-10-CM

## 2022-01-18 DIAGNOSIS — T82.110A BREAKDOWN (MECHANICAL) OF CARDIAC ELECTRODE, INITIAL ENCOUNTER: ICD-10-CM

## 2022-01-18 DIAGNOSIS — I44.2 ATRIOVENTRICULAR BLOCK, COMPLETE: ICD-10-CM

## 2022-01-18 DIAGNOSIS — Z90.89 ACQUIRED ABSENCE OF OTHER ORGANS: Chronic | ICD-10-CM

## 2022-01-18 DIAGNOSIS — Z98.890 OTHER SPECIFIED POSTPROCEDURAL STATES: Chronic | ICD-10-CM

## 2022-01-18 DIAGNOSIS — O02.1 MISSED ABORTION: Chronic | ICD-10-CM

## 2022-01-18 DIAGNOSIS — Z98.891 HISTORY OF UTERINE SCAR FROM PREVIOUS SURGERY: Chronic | ICD-10-CM

## 2022-01-18 LAB
BASOPHILS # BLD AUTO: 0.02 K/UL — SIGNIFICANT CHANGE UP (ref 0–0.2)
BASOPHILS NFR BLD AUTO: 0.2 % — SIGNIFICANT CHANGE UP (ref 0–2)
EOSINOPHIL # BLD AUTO: 0 K/UL — SIGNIFICANT CHANGE UP (ref 0–0.5)
EOSINOPHIL NFR BLD AUTO: 0 % — SIGNIFICANT CHANGE UP (ref 0–6)
HCT VFR BLD CALC: 45.2 % — HIGH (ref 34.5–45)
HGB BLD-MCNC: 14.9 G/DL — SIGNIFICANT CHANGE UP (ref 11.5–15.5)
IMM GRANULOCYTES NFR BLD AUTO: 0.6 % — SIGNIFICANT CHANGE UP (ref 0–1.5)
LYMPHOCYTES # BLD AUTO: 0.71 K/UL — LOW (ref 1–3.3)
LYMPHOCYTES # BLD AUTO: 7.1 % — LOW (ref 13–44)
MCHC RBC-ENTMCNC: 29.6 PG — SIGNIFICANT CHANGE UP (ref 27–34)
MCHC RBC-ENTMCNC: 33 GM/DL — SIGNIFICANT CHANGE UP (ref 32–36)
MCV RBC AUTO: 89.9 FL — SIGNIFICANT CHANGE UP (ref 80–100)
MONOCYTES # BLD AUTO: 0.08 K/UL — SIGNIFICANT CHANGE UP (ref 0–0.9)
MONOCYTES NFR BLD AUTO: 0.8 % — LOW (ref 2–14)
NEUTROPHILS # BLD AUTO: 9.12 K/UL — HIGH (ref 1.8–7.4)
NEUTROPHILS NFR BLD AUTO: 91.3 % — HIGH (ref 43–77)
NRBC # BLD: 0 /100 WBCS — SIGNIFICANT CHANGE UP (ref 0–0)
PLATELET # BLD AUTO: 216 K/UL — SIGNIFICANT CHANGE UP (ref 150–400)
RBC # BLD: 5.03 M/UL — SIGNIFICANT CHANGE UP (ref 3.8–5.2)
RBC # FLD: 13 % — SIGNIFICANT CHANGE UP (ref 10.3–14.5)
WBC # BLD: 9.99 K/UL — SIGNIFICANT CHANGE UP (ref 3.8–10.5)
WBC # FLD AUTO: 9.99 K/UL — SIGNIFICANT CHANGE UP (ref 3.8–10.5)

## 2022-01-18 PROCEDURE — 33235 REMOVAL PACEMAKER ELECTRODE: CPT

## 2022-01-18 PROCEDURE — 33274 TCAT INSJ/RPL PERM LDLS PM: CPT | Mod: Q0

## 2022-01-18 PROCEDURE — 33233 REMOVAL OF PM GENERATOR: CPT

## 2022-01-18 DEVICE — INTRO CATH MICRA 23FR
Type: IMPLANTABLE DEVICE | Status: NON-FUNCTIONAL
Removed: 2022-01-18

## 2022-01-18 DEVICE — GUIDEWIRE AMPLATZ SUPER-STIFF 3MM J .035" X 145CM
Type: IMPLANTABLE DEVICE | Status: NON-FUNCTIONAL
Removed: 2022-01-18

## 2022-01-18 DEVICE — SYS PACEMAKER TRANSCATH MICRA AV SNGL
Type: IMPLANTABLE DEVICE | Status: NON-FUNCTIONAL
Removed: 2022-01-18

## 2022-01-18 DEVICE — SHEATH GLIDELIGHT LASER 14FR
Type: IMPLANTABLE DEVICE | Status: NON-FUNCTIONAL
Removed: 2022-01-18

## 2022-01-18 DEVICE — KIT BRIDGE ACESSORY
Type: IMPLANTABLE DEVICE | Status: NON-FUNCTIONAL
Removed: 2022-01-18

## 2022-01-18 DEVICE — KIT A-LINE 1LUM 20G X 12CM SAFE KIT
Type: IMPLANTABLE DEVICE | Status: NON-FUNCTIONAL
Removed: 2022-01-18

## 2022-01-18 DEVICE — SHEATH GLIDELIGHT LASER 16FR
Type: IMPLANTABLE DEVICE | Status: NON-FUNCTIONAL
Removed: 2022-01-18

## 2022-01-18 DEVICE — DEVICE LOCKING LOCKING LLD EZ CLEARS
Type: IMPLANTABLE DEVICE | Status: NON-FUNCTIONAL
Removed: 2022-01-18

## 2022-01-18 DEVICE — SHEATH TIGHTRAIL SUB C 13FR
Type: IMPLANTABLE DEVICE | Status: NON-FUNCTIONAL
Removed: 2022-01-18

## 2022-01-18 RX ORDER — CHOLECALCIFEROL (VITAMIN D3) 125 MCG
1 CAPSULE ORAL
Qty: 0 | Refills: 0 | DISCHARGE

## 2022-01-18 RX ORDER — HYDROMORPHONE HYDROCHLORIDE 2 MG/ML
0.25 INJECTION INTRAMUSCULAR; INTRAVENOUS; SUBCUTANEOUS
Refills: 0 | Status: DISCONTINUED | OUTPATIENT
Start: 2022-01-18 | End: 2022-01-19

## 2022-01-18 RX ORDER — PANTOPRAZOLE SODIUM 20 MG/1
40 TABLET, DELAYED RELEASE ORAL DAILY
Refills: 0 | Status: DISCONTINUED | OUTPATIENT
Start: 2022-01-19 | End: 2022-01-19

## 2022-01-18 RX ORDER — ONDANSETRON 8 MG/1
4 TABLET, FILM COATED ORAL ONCE
Refills: 0 | Status: DISCONTINUED | OUTPATIENT
Start: 2022-01-18 | End: 2022-01-19

## 2022-01-18 RX ORDER — PANTOPRAZOLE SODIUM 20 MG/1
1 TABLET, DELAYED RELEASE ORAL
Qty: 0 | Refills: 0 | DISCHARGE

## 2022-01-18 RX ORDER — ASPIRIN/CALCIUM CARB/MAGNESIUM 324 MG
1 TABLET ORAL
Qty: 0 | Refills: 0 | DISCHARGE

## 2022-01-18 RX ADMIN — APREPITANT 40 MILLIGRAM(S): 80 CAPSULE ORAL at 11:16

## 2022-01-18 RX ADMIN — SODIUM CHLORIDE 3 MILLILITER(S): 9 INJECTION INTRAMUSCULAR; INTRAVENOUS; SUBCUTANEOUS at 22:30

## 2022-01-18 RX ADMIN — HYDROMORPHONE HYDROCHLORIDE 0.25 MILLIGRAM(S): 2 INJECTION INTRAMUSCULAR; INTRAVENOUS; SUBCUTANEOUS at 23:15

## 2022-01-18 RX ADMIN — SODIUM CHLORIDE 3 MILLILITER(S): 9 INJECTION INTRAMUSCULAR; INTRAVENOUS; SUBCUTANEOUS at 11:16

## 2022-01-18 NOTE — PATIENT PROFILE ADULT - FALL HARM RISK - UNIVERSAL INTERVENTIONS
Bed in lowest position, wheels locked, appropriate side rails in place/Call bell, personal items and telephone in reach/Instruct patient to call for assistance before getting out of bed or chair/Non-slip footwear when patient is out of bed/[place] to call system/Physically safe environment - no spills, clutter or unnecessary equipment/Purposeful Proactive Rounding/Room/bathroom lighting operational, light cord in reach

## 2022-01-18 NOTE — CHART NOTE - NSCHARTNOTEFT_GEN_A_CORE
1/18/2022     1738 1/18/2022     0379    [facility] Medicine Transfer Accept Note--NIGHT HOSPITALIST:   Patient UNKNOWN to me previously, assigned to me at this point by Dr. NESS Lind of [facility] to accept transfer from their service for this 57 y/o F---patient S/P pacemaker lead extraction and MICRA pacemaker placed 1/18/22 in the setting of patient with requirement for lead replacement--patient also with a history of aortic valve replacement (bovine pericardial valve) and aortic aneurysm repair 10 years ago in March 2012, with consideration for possible TAVR or other surgical considerations for the degeneration of the aortic bioprosthesis.   Patient with Pfizer COVID-19 vaccine x 3 but apparently with asymptomatic COVID-19 infection during preprocedure screen on 1/9/22 and 1/16/22 with patient arranged by [facility] for surgeon and ID review of above.   Patient now seen post procedure above.   Patient offers no complaint.   Patient's family aware of admission and course and the patient did not wish examiner to contact her family at this hour.    ROS:  --No fever, no chills, no rigors.  --NO chest pain/pressure.  NO palpitations.  --NO HA, no focal weakness. 1/18/2022     2723    [facility] Medicine Transfer Accept Note--NIGHT HOSPITALIST:   Patient UNKNOWN to me previously, assigned to me at this point by Dr. NESS Lind of [facility] to accept transfer from their service for this 55 y/o F---patient S/P pacemaker lead extraction and MICRA pacemaker placed 1/18/22 in the setting of patient with requirement for lead replacement--patient also with a history of aortic valve replacement (bovine pericardial valve) and aortic aneurysm repair 10 years ago in March 2012, with consideration for possible TAVR or other surgical considerations for the degeneration of the aortic bioprosthesis.   Patient with Pfizer COVID-19 vaccine x 3 but apparently with asymptomatic COVID-19 infection during preprocedure screen on 1/9/22 and 1/16/22 with patient arranged by [facility] for surgeon and ID review of above.   Patient now seen post procedure above.   Patient offers no complaint.   Patient's family aware of admission and course and the patient did not wish examiner to contact her family at this hour.    ROS:  --No fever, no chills, no rigors.  --NO chest pain/pressure.  NO palpitations.  --NO HA, no focal weakness.  --No abdominal pain, no red blood per rectum or melena.  --NO back pain, no tearing back pain.  --NO breast symptoms.  --NO dysuria, no hemauturia.  --NO anorexia or weight loss.  --NO SI/HI.  --NO vaginal bleeding.    Home Medex:  --Cardizem  mg daily  --Vitamin D3  --Pantoprazole 40 mg dailyi  --ASA 81 mg daily  --Meloxicam 15 mg     Past medical history as above.    Past surgical history with AVR and aortic aneurysm repair and breast tylectomy.    NO prior tobacco use.  Rare ethanol--negative CAGE screen.  .    Family history of essential HTN. 1/18/2022     7957    Hospital Medicine Transfer Accept Note--NIGHT HOSPITALIST:   Patient UNKNOWN to me previously, assigned to me at this point by Dr. NESS Lind of [facility] to accept transfer from their service for this 57 y/o F---patient S/P pacemaker lead extraction and MICRA pacemaker placed 1/18/22 in the setting of patient with requirement for lead replacement--patient also with a history of aortic valve replacement (bovine pericardial valve) and aortic aneurysm repair 10 years ago in March 2012, with consideration for possible TAVR or other surgical considerations for the degeneration of the aortic bioprosthesis.   Patient with Pfizer COVID-19 vaccine x 3 but apparently with asymptomatic COVID-19 infection during preprocedure screen on 1/9/22 and 1/16/22 with patient arranged by [facility] for surgeon and ID review of above.   Patient now seen post procedure above.   Patient offers no complaint.   Patient's family aware of admission and course and the patient did not wish examiner to contact her family at this hour.    ROS:  --No fever, no chills, no rigors.  --NO chest pain/pressure.  NO palpitations.  --NO HA, no focal weakness.  --No abdominal pain, no red blood per rectum or melena.  --NO back pain, no tearing back pain.  --NO breast symptoms.  --NO dysuria, no hemauturia.  --NO anorexia or weight loss.  --NO SI/HI.  --NO vaginal bleeding.    Home Medex:  --Cardizem  mg daily  --Vitamin D3  --Pantoprazole 40 mg dailyi  --ASA 81 mg daily  --Meloxicam 15 mg     Past medical history as above.    Past surgical history with AVR and aortic aneurysm repair and breast tylectomy.    NO prior tobacco use.  Rare ethanol--negative CAGE screen.  .    Family history of essential HTN.    Physical exam with a middle aged, nontoxic F in no acute distress.    Afebrile.   HR  65    RR 14   /77  98% on RA  HEENT<PERRL< EOMI  Neck supple  Chest clear    Cor s1 s2  Breast patient declined.  Abdomen soft nontender, normal bowel sounds  Ext no c/c/e.  Neurologic AxOx3.  Speech fluent.  Cognition intact.    Labs from 1/14/22:  WBC 6.7  Hgb 16.1  Platelets of 232K.    Random glucose of 84.  Cr 0.7  K+ 4.1    COVID-19 PCR on 1/9/22 and 1/16/22>>POSITIVE.    Chest radiograph from 1/15/22 reviewed with PPM, no acute infiltrate or effusion.    Impression:  S/P pacemaker lead extraction and MICRA placed 1/18/22 in the setting of AVR degeneration and presently asymptomatic COVID-19 breakthrough infection.   Would clarify with [facility] further considerations with CTS and with the ID consultation.    Plan:  1--   s/P PPM lead extraction and MICRA placement.   [facility]PS following.   Would review with [facility] patient's Diltiazem Rx.    2--   AVR degeneration.   Would contact CTS in the AM on plans for further surgical considerations and ID consultation.    3--   COVID-19 infection.   See above.   Presently asymptomatic>>would review with EPS and CTS the ID consultant involved to review further considerations.    NIGHT HOSPITALIST:    Patient/family aware of course and agree with plan/care as above.   Emotional support provided to patient.   Family aware and agree with course and the patient did not wish for the examiner to contact her family at this time.   Care reviewed with covering NP/PA for endorsement to my physician colleagues.    Maynor Shaikh MD  [phone] 1/18/2022     6504    Hospital Medicine Transfer Accept Note--NIGHT HOSPITALIST:   Patient UNKNOWN to me previously, assigned to me at this point by Dr. NESS Lind of [facility] to accept transfer from their service for this 55 y/o F---patient S/P pacemaker lead extraction and MICRA pacemaker placed 1/18/22 in the setting of patient with requirement for lead replacement--patient also with a history of aortic valve replacement (bovine pericardial valve) and aortic aneurysm repair 10 years ago in March 2012, with consideration for possible TAVR or other surgical considerations for the degeneration of the aortic bioprosthesis.   Patient with Pfizer COVID-19 vaccine x 3 but apparently with asymptomatic COVID-19 infection during preprocedure screen on 1/9/22 and 1/16/22 with patient arranged by [facility] for surgeon and ID review of above.   Patient now seen post procedure above.   Patient offers no complaint.   Patient's family aware of admission and course and the patient did not wish examiner to contact her family at this hour.    ROS:  --No fever, no chills, no rigors.  --NO chest pain/pressure.  NO palpitations.  --NO HA, no focal weakness.  --No abdominal pain, no red blood per rectum or melena.  --NO back pain, no tearing back pain.  --NO breast symptoms.  --NO dysuria, no hemauturia.  --NO anorexia or weight loss.  --NO SI/HI.  --NO vaginal bleeding.    Home Medex:  --Cardizem  mg daily  --Vitamin D3  --Pantoprazole 40 mg dailyi  --ASA 81 mg daily  --Meloxicam 15 mg     Past medical history as above.    Past surgical history with AVR and aortic aneurysm repair and breast tylectomy.    NO prior tobacco use.  Rare ethanol--negative CAGE screen.  .    Family history of essential HTN.    Physical exam with a middle aged, nontoxic F in no acute distress.    Afebrile.   HR  65    RR 14   /77  98% on RA  HEENT<PERRL< EOMI  Neck supple  Chest clear    Cor s1 s2  Breast patient declined.  Abdomen soft nontender, normal bowel sounds  Ext no c/c/e.  Neurologic AxOx3.  Speech fluent.  Cognition intact.    Labs from 1/14/22:  WBC 6.7  Hgb 16.1  Platelets of 232K.    Random glucose of 84.  Cr 0.7  K+ 4.1    COVID-19 PCR on 1/9/22 and 1/16/22>>POSITIVE.    Chest radiograph from 1/15/22 reviewed with PPM, no acute infiltrate or effusion.    Impression:  S/P pacemaker lead extraction and MICRA placed 1/18/22 in the setting of AVR degeneration and presently asymptomatic COVID-19 breakthrough infection.   Would clarify with EPS further considerations with CTS and with the ID consultation.    Plan:  1--   s/P PPM lead extraction and MICRA placement.   EPS following.   Check CBC and post procedure chest radiograph.  Would review with [facility] patient's Diltiazem Rx.    2--   AVR degeneration.   Would contact CTS in the AM on plans for further surgical considerations and ID consultation.    3--   COVID-19 infection.   See above.   Presently asymptomatic>>would review with EPS and CTS the ID consultant involved to review further considerations.    NIGHT HOSPITALIST:    Patient/family aware of course and agree with plan/care as above.   Emotional support provided to patient.   Family aware and agree with course and the patient did not wish for the examiner to contact her family at this time.   Care reviewed with covering NP/PA for endorsement to my physician colleagues.    Maynor Shaikh MD  [phone] 1/18/2022     7591    Hospital Medicine Transfer Accept Note--NIGHT HOSPITALIST:   Patient UNKNOWN to me previously, assigned to me at this point by Dr. NESS Lind of [facility] to accept transfer from their service for this 57 y/o F---patient S/P pacemaker lead extraction and MICRA pacemaker placed 1/18/22 in the setting of patient with requirement for lead replacement--patient also with a history of aortic valve replacement (bovine pericardial valve) and aortic aneurysm repair 10 years ago in March 2012, with consideration for possible TAVR or other surgical considerations for the degeneration of the aortic bioprosthesis.   Patient with Pfizer COVID-19 vaccine x 3 but apparently with asymptomatic COVID-19 infection during preprocedure screen on 1/9/22 and 1/16/22 with patient arranged by [facility] for surgeon and ID review of above.   Patient now seen post procedure above.   Patient offers no complaint.   Patient's family aware of admission and course and the patient did not wish examiner to contact her family at this hour.    ROS:  --No fever, no chills, no rigors.  --NO chest pain/pressure.  NO palpitations.  --NO HA, no focal weakness.  --No abdominal pain, no red blood per rectum or melena.  --NO back pain, no tearing back pain.  --NO breast symptoms.  --NO dysuria, no hemauturia.  --NO anorexia or weight loss.  --NO SI/HI.  --NO vaginal bleeding.    Home Medex:  --Cardizem  mg daily  --Vitamin D3  --Pantoprazole 40 mg dailyi  --ASA 81 mg daily  --Meloxicam 15 mg     Past medical history as above.    Past surgical history with AVR and aortic aneurysm repair and breast tylectomy.    NO prior tobacco use.  Rare ethanol--negative CAGE screen.  .    Family history of essential HTN.    Physical exam with a middle aged, nontoxic F in no acute distress.    Afebrile.   HR  65    RR 14   /77  98% on RA  HEENT<PERRL< EOMI  Neck supple  Chest clear    Cor s1 s2  Breast patient declined.  Abdomen soft nontender, normal bowel sounds  Ext no c/c/e.  Neurologic AxOx3.  Speech fluent.  Cognition intact.    Labs from 1/14/22:  WBC 6.7  Hgb 16.1  Platelets of 232K.    Random glucose of 84.  Cr 0.7  K+ 4.1    COVID-19 PCR on 1/9/22 and 1/16/22>>POSITIVE.    Chest radiograph from 1/15/22 reviewed with PPM, no acute infiltrate or effusion.    Impression:  S/P pacemaker lead extraction and MICRA placed 1/18/22 in the setting of AVR degeneration and presently asymptomatic COVID-19 breakthrough infection.   Would clarify with [facility] further considerations with CTS and with the ID consultation.    Plan:  1--   s/P PPM lead extraction and MICRA placement.   EPS following.   Check CBC and post procedure chest radiograph.  Would review with [facility] patient's Diltiazem Rx.    2--   AVR degeneration.   Would contact CTS in the AM on plans for further surgical considerations and ID consultation.    3--   COVID-19 infection.   See above.   Presently asymptomatic>>would review with EPS and CTS the ID consultant involved to review further considerations.    NIGHT HOSPITALIST:    Patient/family aware of course and agree with plan/care as above.   Emotional support provided to patient.   Family aware and agree with course and the patient did not wish for the examiner to contact her family at this time.   Care reviewed with covering NP/PA, Catalina,  for endorsement to my physician colleagues.    Maynor Shaikh MD  [phone]

## 2022-01-18 NOTE — PRE-ANESTHESIA EVALUATION ADULT - NSANTHPMHFT_GEN_ALL_CORE
56F PMH aortic stenosis s/p AVR/aortic aneurysm repair 10 years ago, PPM (2012), GERD, severe TR, COVID positive.  Currently pending TAVR, severe prosthetic AS.

## 2022-01-18 NOTE — PRE-OP CHECKLIST - INTERNAL PROSTHESES
no
[facility] Hearing Screen Program/[facility] [place] Screening Program/Birth Certificate Instructions/  Immunization Record/Shaken Baby Prevention Handout/Vaccinations

## 2022-01-19 ENCOUNTER — TRANSCRIPTION ENCOUNTER (OUTPATIENT)
Age: 57
End: 2022-01-19

## 2022-01-19 VITALS
SYSTOLIC BLOOD PRESSURE: 124 MMHG | HEART RATE: 50 BPM | DIASTOLIC BLOOD PRESSURE: 65 MMHG | TEMPERATURE: 98 F | RESPIRATION RATE: 18 BRPM | OXYGEN SATURATION: 95 %

## 2022-01-19 DIAGNOSIS — I10 ESSENTIAL (PRIMARY) HYPERTENSION: ICD-10-CM

## 2022-01-19 PROBLEM — N60.99 UNSPECIFIED BENIGN MAMMARY DYSPLASIA OF UNSPECIFIED BREAST: Chronic | Status: ACTIVE | Noted: 2022-01-14

## 2022-01-19 PROBLEM — I07.1 RHEUMATIC TRICUSPID INSUFFICIENCY: Chronic | Status: ACTIVE | Noted: 2022-01-14

## 2022-01-19 PROBLEM — Z87.39 PERSONAL HISTORY OF OTHER DISEASES OF THE MUSCULOSKELETAL SYSTEM AND CONNECTIVE TISSUE: Chronic | Status: ACTIVE | Noted: 2022-01-14

## 2022-01-19 PROBLEM — K21.9 GASTRO-ESOPHAGEAL REFLUX DISEASE WITHOUT ESOPHAGITIS: Chronic | Status: ACTIVE | Noted: 2022-01-14

## 2022-01-19 PROBLEM — I35.0 NONRHEUMATIC AORTIC (VALVE) STENOSIS: Chronic | Status: ACTIVE | Noted: 2022-01-14

## 2022-01-19 PROBLEM — Z95.0 PRESENCE OF CARDIAC PACEMAKER: Chronic | Status: ACTIVE | Noted: 2022-01-14

## 2022-01-19 PROBLEM — U07.1 COVID-19: Chronic | Status: ACTIVE | Noted: 2022-01-14

## 2022-01-19 LAB
ANION GAP SERPL CALC-SCNC: 14 MMOL/L — SIGNIFICANT CHANGE UP (ref 5–17)
BASOPHILS # BLD AUTO: 0.01 K/UL — SIGNIFICANT CHANGE UP (ref 0–0.2)
BASOPHILS NFR BLD AUTO: 0.1 % — SIGNIFICANT CHANGE UP (ref 0–2)
BUN SERPL-MCNC: 15 MG/DL — SIGNIFICANT CHANGE UP (ref 7–23)
CALCIUM SERPL-MCNC: 9.7 MG/DL — SIGNIFICANT CHANGE UP (ref 8.4–10.5)
CHLORIDE SERPL-SCNC: 103 MMOL/L — SIGNIFICANT CHANGE UP (ref 96–108)
CO2 SERPL-SCNC: 21 MMOL/L — LOW (ref 22–31)
CREAT SERPL-MCNC: 0.55 MG/DL — SIGNIFICANT CHANGE UP (ref 0.5–1.3)
EOSINOPHIL # BLD AUTO: 0 K/UL — SIGNIFICANT CHANGE UP (ref 0–0.5)
EOSINOPHIL NFR BLD AUTO: 0 % — SIGNIFICANT CHANGE UP (ref 0–6)
GLUCOSE SERPL-MCNC: 126 MG/DL — HIGH (ref 70–99)
HCT VFR BLD CALC: 43 % — SIGNIFICANT CHANGE UP (ref 34.5–45)
HGB BLD-MCNC: 14.2 G/DL — SIGNIFICANT CHANGE UP (ref 11.5–15.5)
IMM GRANULOCYTES NFR BLD AUTO: 0.7 % — SIGNIFICANT CHANGE UP (ref 0–1.5)
LYMPHOCYTES # BLD AUTO: 0.84 K/UL — LOW (ref 1–3.3)
LYMPHOCYTES # BLD AUTO: 7.2 % — LOW (ref 13–44)
MCHC RBC-ENTMCNC: 29.5 PG — SIGNIFICANT CHANGE UP (ref 27–34)
MCHC RBC-ENTMCNC: 33 GM/DL — SIGNIFICANT CHANGE UP (ref 32–36)
MCV RBC AUTO: 89.4 FL — SIGNIFICANT CHANGE UP (ref 80–100)
MONOCYTES # BLD AUTO: 0.32 K/UL — SIGNIFICANT CHANGE UP (ref 0–0.9)
MONOCYTES NFR BLD AUTO: 2.7 % — SIGNIFICANT CHANGE UP (ref 2–14)
NEUTROPHILS # BLD AUTO: 10.47 K/UL — HIGH (ref 1.8–7.4)
NEUTROPHILS NFR BLD AUTO: 89.3 % — HIGH (ref 43–77)
NRBC # BLD: 0 /100 WBCS — SIGNIFICANT CHANGE UP (ref 0–0)
PLATELET # BLD AUTO: 248 K/UL — SIGNIFICANT CHANGE UP (ref 150–400)
POTASSIUM SERPL-MCNC: 4.3 MMOL/L — SIGNIFICANT CHANGE UP (ref 3.5–5.3)
POTASSIUM SERPL-SCNC: 4.3 MMOL/L — SIGNIFICANT CHANGE UP (ref 3.5–5.3)
RBC # BLD: 4.81 M/UL — SIGNIFICANT CHANGE UP (ref 3.8–5.2)
RBC # FLD: 12.5 % — SIGNIFICANT CHANGE UP (ref 10.3–14.5)
SODIUM SERPL-SCNC: 138 MMOL/L — SIGNIFICANT CHANGE UP (ref 135–145)
WBC # BLD: 11.72 K/UL — HIGH (ref 3.8–10.5)
WBC # FLD AUTO: 11.72 K/UL — HIGH (ref 3.8–10.5)

## 2022-01-19 PROCEDURE — C2629: CPT

## 2022-01-19 PROCEDURE — 71046 X-RAY EXAM CHEST 2 VIEWS: CPT

## 2022-01-19 PROCEDURE — 85025 COMPLETE CBC W/AUTO DIFF WBC: CPT

## 2022-01-19 PROCEDURE — C1773: CPT

## 2022-01-19 PROCEDURE — 33235 REMOVAL PACEMAKER ELECTRODE: CPT

## 2022-01-19 PROCEDURE — 82962 GLUCOSE BLOOD TEST: CPT

## 2022-01-19 PROCEDURE — 71046 X-RAY EXAM CHEST 2 VIEWS: CPT | Mod: 26

## 2022-01-19 PROCEDURE — C1894: CPT

## 2022-01-19 PROCEDURE — 93286 PERI-PX EVAL PM/LDLS PM IP: CPT | Mod: XS

## 2022-01-19 PROCEDURE — 76000 FLUOROSCOPY <1 HR PHYS/QHP: CPT

## 2022-01-19 PROCEDURE — 80048 BASIC METABOLIC PNL TOTAL CA: CPT

## 2022-01-19 PROCEDURE — 93005 ELECTROCARDIOGRAM TRACING: CPT

## 2022-01-19 PROCEDURE — 33274 TCAT INSJ/RPL PERM LDLS PM: CPT

## 2022-01-19 PROCEDURE — 33233 REMOVAL OF PM GENERATOR: CPT

## 2022-01-19 PROCEDURE — C1769: CPT

## 2022-01-19 PROCEDURE — 86923 COMPATIBILITY TEST ELECTRIC: CPT

## 2022-01-19 PROCEDURE — C1786: CPT

## 2022-01-19 PROCEDURE — C9399: CPT

## 2022-01-19 RX ORDER — MELOXICAM 15 MG/1
1 TABLET ORAL
Qty: 0 | Refills: 0 | DISCHARGE

## 2022-01-19 RX ORDER — ACETAMINOPHEN 500 MG
650 TABLET ORAL EVERY 6 HOURS
Refills: 0 | Status: DISCONTINUED | OUTPATIENT
Start: 2022-01-19 | End: 2022-01-19

## 2022-01-19 RX ORDER — DILTIAZEM HCL 120 MG
1 CAPSULE, EXT RELEASE 24 HR ORAL
Qty: 0 | Refills: 0 | DISCHARGE

## 2022-01-19 RX ADMIN — Medication 650 MILLIGRAM(S): at 09:10

## 2022-01-19 RX ADMIN — HYDROMORPHONE HYDROCHLORIDE 0.25 MILLIGRAM(S): 2 INJECTION INTRAMUSCULAR; INTRAVENOUS; SUBCUTANEOUS at 03:04

## 2022-01-19 RX ADMIN — PANTOPRAZOLE SODIUM 40 MILLIGRAM(S): 20 TABLET, DELAYED RELEASE ORAL at 11:46

## 2022-01-19 RX ADMIN — Medication 650 MILLIGRAM(S): at 10:19

## 2022-01-19 RX ADMIN — SODIUM CHLORIDE 3 MILLILITER(S): 9 INJECTION INTRAMUSCULAR; INTRAVENOUS; SUBCUTANEOUS at 06:23

## 2022-01-19 RX ADMIN — SODIUM CHLORIDE 3 MILLILITER(S): 9 INJECTION INTRAMUSCULAR; INTRAVENOUS; SUBCUTANEOUS at 13:11

## 2022-01-19 NOTE — DISCHARGE NOTE PROVIDER - CARE PROVIDERS DIRECT ADDRESSES
,[email or web address],DirectAddress_Unknown ,[email or web address],DirectAddress_Unknown,[email or web address]

## 2022-01-19 NOTE — DISCHARGE NOTE PROVIDER - NSDCMRMEDTOKEN_GEN_ALL_CORE_FT
aspirin 81 mg oral delayed release tablet: 1 tab(s) orally once a day  dilTIAZem 240 mg/24 hours oral tablet, extended release: 1 tab(s) orally once a day  pantoprazole 40 mg oral delayed release tablet: 1 tab(s) orally once a day  Vitamin D3 50 mcg (2000 intl units) oral tablet: 1 tab(s) orally once a day   aspirin 81 mg oral delayed release tablet: 1 tab(s) orally once a day  pantoprazole 40 mg oral delayed release tablet: 1 tab(s) orally once a day  Vitamin D3 50 mcg (2000 intl units) oral tablet: 1 tab(s) orally once a day

## 2022-01-19 NOTE — DISCHARGE NOTE PROVIDER - PROVIDER TOKENS
PROVIDER:[TOKEN:[[id number]]],PROVIDER:[TOKEN:[[id number]]] PROVIDER:[TOKEN:[[id number]]],PROVIDER:[TOKEN:[[id number]]],PROVIDER:[TOKEN:[[id number]]]

## 2022-01-19 NOTE — PROVIDER CONTACT NOTE (OTHER) - SITUATION
Patient diagnosed with Covid >10 days ago (+ 1/9). Asymptomatic. No further isolation required.
Patient feels palpitations
Patient heart rate dropped down to 48-53 while being V-paced 60-70s.

## 2022-01-19 NOTE — DISCHARGE NOTE PROVIDER - NSDCFUSCHEDAPPT_GEN_ALL_CORE_FT
PRECIOUS [place] ; 01/28/2022 ; [facility] Cardio Electro [address] PRECIOUS [place] ; 02/03/2022 ; [facility] Ultrasound [address]

## 2022-01-19 NOTE — CHART NOTE - NSCHARTNOTEFT_GEN_A_CORE
1/19	structural heart was called by Attending-- plan is op w/u. She feels palpitation--HR 48--50s, EP was called -- will come to see her , CXR pending--potential d/c today. Per Rad CXR is ok, EP cleared for disc-- cardizem on hold until next appointment--spoke to EP.  [place] (PA) [employer] # [id number]

## 2022-01-19 NOTE — DISCHARGE NOTE PROVIDER - NSDCCPCAREPLAN_GEN_ALL_CORE_FT
PRINCIPAL DISCHARGE DIAGNOSIS  Diagnosis: Mechanical breakdown of cardiac electrode  Assessment and Plan of Treatment: stable post procedure, follow up with EP and Structural Heart as scheduled.      SECONDARY DISCHARGE DIAGNOSES  Diagnosis: 2019 novel coronavirus disease (COVID-19)  Assessment and Plan of Treatment: stable    Diagnosis: H/O aortic valve stenosis  Assessment and Plan of Treatment: follow up with [facility]     PRINCIPAL DISCHARGE DIAGNOSIS  Diagnosis: Mechanical breakdown of cardiac electrode  Assessment and Plan of Treatment: stable post procedure, follow up with EP and [facility] as scheduled.      SECONDARY DISCHARGE DIAGNOSES  Diagnosis: HTN (hypertension)  Assessment and Plan of Treatment: Cardizem 240 mg is on hold until next appointment per EP    Diagnosis: 2019 novel coronavirus disease (COVID-19)  Assessment and Plan of Treatment: stable    Diagnosis: H/O aortic valve stenosis  Assessment and Plan of Treatment: follow up with [facility]

## 2022-01-19 NOTE — PROGRESS NOTE ADULT - ASSESSMENT
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 She underwent lead extraction in the setting of lead fracture, complete heart block and s/p Micra AV implantation (Medtronic) on 1/18/22.     -Post procedure teaching enforced with the patient  -Post-op device interrogated, normal device function.   -Pt c/o intermittent palpitations and lightheadedness with tele revealed RV pacing and not tracking the atrium, Micra AV adjusted to allow for better tracking of the atrium.   -CXR revealed good device placement  -Follow up in EP clinic as scheduled on 1/28/22 at 9:40am and Structural Heart in 1 month as scheduled.   -Clear from EP perspective for discharge planning  -Plan discussed with Dr. Lind and primary team.    TALA Rodriguez NP-C  [phone]  
56F hx aneurysm repair, AV/TV disease, PPM, p/w lead malfunction s/p extraction with micra placement. Recent COVID+ on the 9th.

## 2022-01-19 NOTE — PROGRESS NOTE ADULT - SUBJECTIVE AND OBJECTIVE BOX
[facility] Division of Hospital Medicine  Gracy Dinh MD  Pager (M-F, 8A-5P): [phone]  Other Times:  [phone]    Patient is a 56y old  Female who presents with a chief complaint of pacemaker leads need to be changed (14 Jan 2022 15:44)      SUBJECTIVE / OVERNIGHT EVENTS:  anxious and tearful. reports an episode of "palpitations" this am  . afebrile.     ADDITIONAL REVIEW OF SYSTEMS: denies other symptoms     MEDICATIONS  (STANDING):  cefuroxime  IVPB 1500 milliGRAM(s) IV Intermittent once  chlorhexidine 2% Cloths 1 Application(s) Topical once  lidocaine 1% Injectable 0.2 milliLiter(s) Local Injection once  pantoprazole  Injectable 40 milliGRAM(s) IV Push daily  sodium chloride 0.9% lock flush 3 milliLiter(s) IV Push every 8 hours    MEDICATIONS  (PRN):  acetaminophen     Tablet .. 650 milliGRAM(s) Oral every 6 hours PRN Temp greater or equal to 38C (100.4F), Mild Pain (1 - 3)  HYDROmorphone  Injectable 0.25 milliGRAM(s) IV Push every 10 minutes PRN Severe Pain (7 - 10)  ondansetron Injectable 4 milliGRAM(s) IV Push once PRN Nausea and/or Vomiting      CAPILLARY BLOOD GLUCOSE        I&O's Summary    18 Jan 2022 07:01  -  19 Jan 2022 07:00  --------------------------------------------------------  IN: 0 mL / OUT: 1 mL / NET: -1 mL    19 Jan 2022 07:01  -  19 Jan 2022 13:52  --------------------------------------------------------  IN: 600 mL / OUT: 0 mL / NET: 600 mL        PHYSICAL EXAM:  Vital Signs Last 24 Hrs  T(C): 36.6 (19 Jan 2022 11:24), Max: 36.8 (18 Jan 2022 17:30)  T(F): 97.8 (19 Jan 2022 11:24), Max: 98.2 (18 Jan 2022 17:30)  HR: 50 (19 Jan 2022 11:24) (50 - 80)  BP: 124/65 (19 Jan 2022 11:24) (108/68 - 143/78)  BP(mean): 84 (18 Jan 2022 17:00) (82 - 84)  RR: 18 (19 Jan 2022 11:24) (15 - 18)  SpO2: 95% (19 Jan 2022 11:24) (94% - 99%)    CONSTITUTIONAL: NAD, well-groomed  EYES:  conjunctiva and sclera clear  ENMT: Moist oral mucosa  NECK: Supple, no palpable masses; no JVD  RESPIRATORY: Normal respiratory effort; lungs are clear to auscultation bilaterally  CARDIOVASCULAR: Regular rate and rhythm, normal S1 and S2, no murmur/rub/gallop; No lower extremity edema  ABDOMEN: Nontender to palpation, normoactive bowel sounds, no rebound/guarding  MUSCULOSKELETAL:  no clubbing or cyanosis of digits; no joint swelling or tenderness to palpation  PSYCH: A+O to person, place, and time; affect appropriate  SKIN: No rashes; no palpable lesions    LABS:                        14.2   11.72 )-----------( 248      ( 19 Jan 2022 06:31 )             43.0     01-19    138  |  103  |  15  ----------------------------<  126<H>  4.3   |  21<L>  |  0.55    Ca    9.7      19 Jan 2022 06:30                  RADIOLOGY & ADDITIONAL TESTS:  Results Reviewed:   Imaging Personally Reviewed:  Electrocardiogram Personally Reviewed:    COORDINATION OF CARE:  Care Discussed with Consultants/Other Providers [Y/N]:  Prior or Outpatient Records Reviewed [Y/N]:  
24H hour events: Pt c/o intermittent palpitations and lightheadedness this morning. Tele revealed RV pacing and not tracking the atrium, HR 60-80's       MEDICATIONS:  cefuroxime  IVPB 1500 milliGRAM(s) IV Intermittent once  acetaminophen     Tablet .. 650 milliGRAM(s) Oral every 6 hours PRN  HYDROmorphone  Injectable 0.25 milliGRAM(s) IV Push every 10 minutes PRN  ondansetron Injectable 4 milliGRAM(s) IV Push once PRN  Pantoprazole  Injectable 40 milliGRAM(s) IV Push daily  chlorhexidine 2% Cloths 1 Application(s) Topical once  sodium chloride 0.9% lock flush 3 milliLiter(s) IV Push every 8 hours      REVIEW OF SYSTEMS:  Complete 10point ROS negative.    PHYSICAL EXAM:  T(C): 36.6 (01-19-22 @ 11:24), Max: 36.8 (01-18-22 @ 17:30)  HR: 50 (01-19-22 @ 11:24) (50 - 80)  BP: 124/65 (01-19-22 @ 11:24) (108/68 - 143/78)  RR: 18 (01-19-22 @ 11:24) (15 - 18)  SpO2: 95% (01-19-22 @ 11:24) (94% - 99%)  Wt(kg): --  I&O's Summary    18 Jan 2022 07:01  -  19 Jan 2022 07:00  --------------------------------------------------------  IN: 0 mL / OUT: 1 mL / NET: -1 mL    19 Jan 2022 07:01  -  19 Jan 2022 14:54  --------------------------------------------------------  IN: 600 mL / OUT: 0 mL / NET: 600 mL      Appearance: Normal	  Psychiatry: A & O x 3, Mood & affect appropriate  Skin: LCW extraction site C/D/I without hematoma   Extremities: No edema. Right groin site soft and non-tender. [facility]   Vascular: Peripheral pulses palpable 2+ bilaterally      LABS:	 	    CBC Full  -  ( 19 Jan 2022 06:31 )  WBC Count : 11.72 K/uL  Hemoglobin : 14.2 g/dL  Hematocrit : 43.0 %  Platelet Count - Automated : 248 K/uL  Mean Cell Volume : 89.4 fl  Mean Cell Hemoglobin : 29.5 pg  Mean Cell Hemoglobin Concentration : 33.0 gm/dL  Auto Neutrophil # : 10.47 K/uL  Auto Lymphocyte # : 0.84 K/uL  Auto Monocyte # : 0.32 K/uL  Auto Eosinophil # : 0.00 K/uL  Auto Basophil # : 0.01 K/uL  Auto Neutrophil % : 89.3 %  Auto Lymphocyte % : 7.2 %  Auto Monocyte % : 2.7 %  Auto Eosinophil % : 0.0 %  Auto Basophil % : 0.1 %    01-19    138  |  103  |  15  ----------------------------<  126<H>  4.3   |  21<L>  |  0.55    Ca    9.7      19 Jan 2022 06:30      TELEMETRY: RV pacing and not tracking the atrium, HR 60-80's      	    ECG:  SR with V pacing, not tracking the atrium     < from: Intra-Operative Transesophageal Echo (01.18.22 @ 14:16) >  Dimensions:    Normal Values:  LA:            2.0 - 4.0 cm  Ao:            2.0 - 3.8 cm  SEPTUM:        0.6 - 1.2 cm  PWT:           0.6 - 1.1 cm  LVIDd:         3.0 - 5.6 cm  LVIDs:         1.8 - 4.0 cm  EF (Visual Estimate): 65 %  ------------------------------------------------------------------------  Pre-Bypass Observations:  Mitral Valve: Normal mitral valve.  Small fibrinous strands  noted. Minimal mitral regurgitation.  Aortic Valve/Aorta: Normal bioprosthetic aortic valve.  Bioprosthetic aortic valve in place.  No significant  stenosis noted.  Mild restriction of RCC and NCC noted.  Peak gradient: 23 mmHg, Mean gradient: 14 mmHg.  DVI: 0.37.  No bioprosthetic aortic valve regurgitation seen.  Normal aortic root, s/p repair.  Left Atrium: Normal left atrium.  Left Ventricle: Normal left ventricular systolic function.  No segmental wall motion abnormalities. Normal left  ventricular internal dimensions and wall thicknesses.  Right Heart: Normal right atrium.  Small mobile fibrous  strand on atrial lead. Normal right ventricular function.  Mild right ventricular dilatation. Severe tricuspid  regurgitation.  Septal leaflet restricted by pacemaker  lead. Normal pulmonic valve. Mild pulmonic regurgitation.  Pericardium/Pleura: Normal pericardium with no pericardial  effusion.  Hemodynamic: Estimated right atrial pressure is 8 mm Hg.  ------------------------------------------------------------------------  Post-Bypass Observations:    No pericardial effusion.  Tricuspid regurgitation  unchanged after lead removal.  ------------------------------------------------------------------------  Conclusions:  1. Normal mitral valve.  Small fibrinous strands noted. Minimal mitral regurgitation.  2. Bioprosthetic aortic valve in place.  No significant  stenosis noted.  Mild restriction of RCC and NCC noted.  Peak gradient:23 mmHg, Mean gradient: 14 mmHg.  DVI: 0.37.  No bioprosthetic aortic valve regurgitation seen.  3. Normal aortic root, s/p repair.  4. Normal left atrium.  5. Normal left ventricular internal dimensions and wall  thicknesses.  6. Normal left ventricular systolic function. No segmental  wall motion abnormalities.  7. Normal right atrium.  Small mobile fibrous strand on  atrial lead.  No PFO visualized.  8. Normal right ventricular function.  Mild right  ventricular dilatation.  9. Severe tricuspid regurgitation.  Septal leaflet  restricted by pacemaker lead.  10. Normal pulmonic valve. Mild pulmonic regurgitation.  11. Normal pericardium with no pericardial effusion.  Confirmed on  1/18/2022 - 15:01:44 by Moises De La Rosa M.D.  ------------------------------------------------------------------------    < end of copied text >

## 2022-01-19 NOTE — PROGRESS NOTE ADULT - PROBLEM SELECTOR PLAN 1
pacemaker lead extraction   MICRA AICD Placement done 1/18  awaiting CXR  reports some palpitations this am -d.w EP team awaiting f/u

## 2022-01-19 NOTE — CHART NOTE - NSCHARTNOTEFT_GEN_A_CORE
Patient's ELIZABETH from lead extraction reviewed with Loyd Reynoso and Hanane. Her prosthetic Aortic Valve is functioning well, and no intervention is indicated, however she does have severe Tricuspid Regurgitation. We will see her in one month post extraction for evaluation in our Tricuspid Clinic. We will see her at 1 pm on 2/28/2022. Our office number is [phone]. [place]  [address]

## 2022-01-19 NOTE — DISCHARGE NOTE PROVIDER - CARE PROVIDER_API CALL
Genia Lind)  Cardiac Electrophysiology; Cardiovascular Disease; Internal Medicine  [address] [place] [address]  Phone: [phone]  Fax: [phone]  Follow Up Time:     Taty Mills)  Internal Medicine; Nephrology  Follow Up Time:    Genia Lind)  Cardiac Electrophysiology; Cardiovascular Disease; Internal Medicine  [address] [place] [address]  Phone: [phone]  Fax: [phone]  Follow Up Time:     Taty Mills)  Internal Medicine; Nephrology  Follow Up Time:     Eden Templeton)  Cardiovascular Disease  [address] [place] [address]  Phone: [phone]  Fax: [phone]  Follow Up Time:

## 2022-01-19 NOTE — PROVIDER CONTACT NOTE (OTHER) - ASSESSMENT
Patient denies any chest pain, shortness of breath, dizziness. Patient is Patient denies any chest pain, shortness of breath, dizziness. Patient VSS.

## 2022-01-19 NOTE — DISCHARGE NOTE PROVIDER - HOSPITAL COURSE
55 y/o F---patient S/P pacemaker lead extraction and MICRA pacemaker placed 1/18/22 in the setting of patient with requirement for lead replacement--patient also with a history of aortic valve replacement (bovine pericardial valve) and aortic aneurysm repair 10 years ago in March 2012, with consideration for possible TAVR or other surgical considerations for the degeneration of the aortic bioprosthesis.   Patient with Pfizer COVID-19 vaccine x 3 but apparently with asymptomatic COVID-19 infection during preprocedure screen on 1/9/22 and 1/16/22 with patient arranged by EPS for surgeon and ID review of above.   Patient now seen post procedure above.   Patient offers no complaint.   Patient's family aware of admission and course and the patient did not wish examiner to contact her family at this hour.  57 y/o F---patient S/P pacemaker lead extraction and MICRA pacemaker placed 1/18/22 in the setting of patient with requirement for lead replacement--patient also with a history of aortic valve replacement (bovine pericardial valve) and aortic aneurysm repair 10 years ago in March 2012, with consideration for possible TAVR or other surgical considerations for the degeneration of the aortic bioprosthesis.   Patient with Pfizer COVID-19 vaccine x 3 but apparently with asymptomatic COVID-19 infection during preprocedure screen on 1/9/22 and 1/16/22 with patient arranged by EPS for surgeon and ID review of above.   Patient now seen post procedure above.   Patient offers no complaint.   Patient's family aware of admission and course and the patient did not wish examiner to contact her family at this hour.  A CXR was done today and is ok, she was seen by EP and cleared her for discharge, spoke to Attending.

## 2022-01-19 NOTE — PROVIDER CONTACT NOTE (OTHER) - BACKGROUND
Patient admitted for a breakdown of cardiac electrode
Patient admitted for breakdown of cardiac electrode

## 2022-01-19 NOTE — PROGRESS NOTE ADULT - PROBLEM SELECTOR PLAN 2
s/p valve replacement   d/w structural heart team, no further inpt plan for w/u   Will follow up with Dr. Koo in office in one month post extraction for evaluation in our Tricuspid Clinic at 1 pm on 2/28/2022.  office number is [phone].

## 2022-01-27 ENCOUNTER — RESULT CHARGE (OUTPATIENT)
Age: 57
End: 2022-01-27

## 2022-01-28 ENCOUNTER — APPOINTMENT (OUTPATIENT)
Dept: ELECTROPHYSIOLOGY | Facility: CLINIC | Age: 57
End: 2022-01-28
Payer: COMMERCIAL

## 2022-01-28 ENCOUNTER — NON-APPOINTMENT (OUTPATIENT)
Age: 57
End: 2022-01-28

## 2022-01-28 VITALS
HEIGHT: 62 IN | OXYGEN SATURATION: 99 % | BODY MASS INDEX: 22.08 KG/M2 | SYSTOLIC BLOOD PRESSURE: 118 MMHG | DIASTOLIC BLOOD PRESSURE: 78 MMHG | HEART RATE: 80 BPM | WEIGHT: 120 LBS

## 2022-01-28 PROCEDURE — 93000 ELECTROCARDIOGRAM COMPLETE: CPT | Mod: 59

## 2022-01-28 PROCEDURE — 99024 POSTOP FOLLOW-UP VISIT: CPT

## 2022-01-28 RX ORDER — ALUMINUM CHLORIDE 20 %
20 SOLUTION, NON-ORAL TOPICAL
Qty: 35 | Refills: 0 | Status: DISCONTINUED | COMMUNITY
Start: 2017-04-15 | End: 2022-01-28

## 2022-01-28 RX ORDER — CONJUGATED ESTROGENS 0.62 MG/G
0.62 CREAM VAGINAL
Qty: 30 | Refills: 0 | Status: DISCONTINUED | COMMUNITY
Start: 2017-02-28 | End: 2022-01-28

## 2022-01-28 RX ORDER — DILTIAZEM HYDROCHLORIDE 240 MG/1
240 CAPSULE, EXTENDED RELEASE ORAL
Qty: 90 | Refills: 0 | Status: DISCONTINUED | COMMUNITY
Start: 2017-03-14 | End: 2022-01-28

## 2022-02-03 ENCOUNTER — APPOINTMENT (OUTPATIENT)
Dept: ULTRASOUND IMAGING | Facility: HOSPITAL | Age: 57
End: 2022-02-03

## 2022-02-10 ENCOUNTER — OUTPATIENT (OUTPATIENT)
Dept: OUTPATIENT SERVICES | Facility: HOSPITAL | Age: 57
LOS: 1 days | End: 2022-02-10
Payer: COMMERCIAL

## 2022-02-10 ENCOUNTER — APPOINTMENT (OUTPATIENT)
Dept: CARDIOTHORACIC SURGERY | Facility: CLINIC | Age: 57
End: 2022-02-10
Payer: COMMERCIAL

## 2022-02-10 VITALS
DIASTOLIC BLOOD PRESSURE: 81 MMHG | TEMPERATURE: 97.7 F | RESPIRATION RATE: 18 BRPM | HEART RATE: 70 BPM | SYSTOLIC BLOOD PRESSURE: 162 MMHG | OXYGEN SATURATION: 99 %

## 2022-02-10 VITALS — BODY MASS INDEX: 22.45 KG/M2 | HEIGHT: 62 IN | WEIGHT: 122 LBS

## 2022-02-10 DIAGNOSIS — Z98.890 OTHER SPECIFIED POSTPROCEDURAL STATES: Chronic | ICD-10-CM

## 2022-02-10 DIAGNOSIS — Z95.2 PRESENCE OF PROSTHETIC HEART VALVE: Chronic | ICD-10-CM

## 2022-02-10 DIAGNOSIS — I36.1 NONRHEUMATIC TRICUSPID (VALVE) INSUFFICIENCY: ICD-10-CM

## 2022-02-10 DIAGNOSIS — O02.1 MISSED ABORTION: Chronic | ICD-10-CM

## 2022-02-10 DIAGNOSIS — Z98.891 HISTORY OF UTERINE SCAR FROM PREVIOUS SURGERY: Chronic | ICD-10-CM

## 2022-02-10 DIAGNOSIS — Z90.89 ACQUIRED ABSENCE OF OTHER ORGANS: Chronic | ICD-10-CM

## 2022-02-10 PROCEDURE — 93306 TTE W/DOPPLER COMPLETE: CPT | Mod: 26

## 2022-02-10 PROCEDURE — 99204 OFFICE O/P NEW MOD 45 MIN: CPT

## 2022-02-10 PROCEDURE — 93306 TTE W/DOPPLER COMPLETE: CPT

## 2022-02-10 RX ORDER — FEXOFENADINE HYDROCHLORIDE 180 MG/1
180 TABLET, FILM COATED ORAL DAILY
Refills: 0 | Status: ACTIVE | COMMUNITY
Start: 2022-02-10

## 2022-02-10 RX ORDER — ASPIRIN 81 MG/1
81 TABLET, FILM COATED ORAL DAILY
Refills: 0 | Status: ACTIVE | COMMUNITY
Start: 2022-02-10

## 2022-02-10 RX ORDER — MONTELUKAST 10 MG/1
10 TABLET, FILM COATED ORAL DAILY
Refills: 0 | Status: ACTIVE | COMMUNITY
Start: 2017-02-26

## 2022-02-10 RX ORDER — MELOXICAM 15 MG/1
15 TABLET ORAL DAILY
Refills: 0 | Status: ACTIVE | COMMUNITY
Start: 2017-05-08

## 2022-02-10 RX ORDER — FUROSEMIDE 20 MG/1
20 TABLET ORAL DAILY
Qty: 30 | Refills: 1 | Status: ACTIVE | COMMUNITY
Start: 2022-02-10 | End: 1900-01-01

## 2022-02-10 NOTE — HISTORY OF PRESENT ILLNESS
[FreeTextEntry1] : Ms. Cabral is a 56 year old female here today for follow up evaluation of tricuspid evaluation following PPM lead extraction in the setting of lead fracture, complete heart block, and Micra AV placement on 1/11/22.  She has a history of AVR with Dr. Rowe on 3/16/2012 (#21 bovine pericardial valve) and was evaluated again on 12/9 for bioprosthetic AS. That referral (from Dr Robert to Dr Rowe) was prompted by an outpatient TTE with Dr Robert that suggested significant bioprosthetic AS. She was being considered for a TAV in [place], but then was found to have a fractured pacing lead warranting extraction and replacement. At the time of her lead extraction with Dr Lind, the intraoperative ELIZABETH demonstrated that her bioAVR was functioning appropriately--but that she did in fact have severe TR. She was then referred to see the Structural Heart team to discuss transcatheter treatment options for symptomatic TR.\par \par She reports no symptomatic improvement since her pacing lead extraction and MICRA implant. She continues to report dyspnea on exertion when walking an incline or climbing a FOS. She has increasing fatigue and she is "sitting around a lot more" which she attributes to "a cluster of everything" including, in her opinion, having had COVID in January of this year. \par \par She returns to clinic today for reassessment of her symptoms, and her TR, following the recent lead extraction. She was previously on Diltiazem 240mg that was held following her PPM. She is hypertensive today. She reports two months of increasing dyspnea with minimal exertion, even with just with normal activities around her home (NYHA III). She also notes exertional chest heaviness associated with the dyspnea. She has had no additional hospitalizations or emergency room visits aside from her planned PPM extraction. Her appetite is normal. She weighs 122 pounds and states that her ideal weight is around 115 pounds; she states that when she is a few pounds over her ideal weight her AMIN is more pronounced. She feels a bit deconditioned, which she attributes to a constellation of factors. \par \par Her ELIZABETH at the time of the lead extraction demonstrated severe TR between the posterior and septal leaflets. Removing the pacing lead had no impact on TR severity (i.e. it was neither better nor worse after the lead was removed). The bioAVR was also assessed at the time of the intraoperative ELIZABETH (during the lead extraction) with peak and mean gradients of 23 and 12 DVI 0.37 and LAYNE by 3-D planimetry of 1.47 sqcm. Repeat echocardiogram today demonstrates TR that remains severe with a dilated RV and normal function.

## 2022-02-10 NOTE — REASON FOR VISIT
[Structural Heart and Valve Disease] : structural heart and valve disease [Other: ____] : [unfilled] [FreeTextEntry3] : Dr. Robert

## 2022-02-10 NOTE — CARDIOLOGY SUMMARY
[de-identified] : V paced 81 [de-identified] : 11/04/21\par EF 55-60%, LAYNE 0.8 sqcm, mGr 21 mmHg, PGr 41 mmHg, AoV 3.2, DVI 0.27, mild MR, severe TR [de-identified] : 12/21/21 - TAVR CT completed, see final report for measurements

## 2022-02-10 NOTE — REVIEW OF SYSTEMS
[Feeling Fatigued] : feeling fatigued [Dyspnea on exertion] : dyspnea during exertion [Chest Discomfort] : chest discomfort [Negative] : Heme/Lymph [Fever] : no fever [Chills] : no chills [Lower Ext Edema] : no extremity edema [Palpitations] : no palpitations [Orthopnea] : no orthopnea [PND] : no PND [Abdominal Pain] : no abdominal pain [Change in Appetite] : no change in appetite [FreeTextEntry9] : arthritis

## 2022-02-10 NOTE — DISCUSSION/SUMMARY
[FreeTextEntry1] : Lesvia has symptomatic severe tricuspid regurgitation that is unchanged following recent pacing lead extraction. As we discussed, her symptoms are likely multifactorial in etiology. She had a remote SAVR with Dr Rowe and is referred for evaluation and discussion of TR treatment options. \par \par She reports fatigue and AMIN but has no sequelae on exam of right heart failure; that said, her IVC is plethoric on her TTE today. Also of note, she is hypertensive since stopping her CCB in January. As such, I am recommending she start 20mg of daily Furosemide today, supplement her dietary potassium (she will add a banana each morning), and check labs next week (I provided a Rx, she will call next week to review once completed). \par \par Regarding her TTE done today, which I have reviewed with Dr Eden Templeton in detail, we are in agreement that her TV anatomy (and TR location between the P and S leaflets) can be quite challenging for transcatheter edge to edge repair (KATHARINE), which we are doing as part of the TRILUMINATE Pivotal Trial. We expect to be activated and able to enroll patients shortly in the TRISCEND II Trial, also for patients with symptomatic severe TR, using the Feliz EVOQUE valve for transcatheter tricuspid valve replacement (TTVR). As such, I will have her return to see us in approximately 4 weeks, reassess her BP and symptoms with the addition of the diuretic, and hopefully have a better idea of timing for the start of the trial (as well as the inclusion and exclusion criteria). Ultimately, for any of the TR studies, she will require evaluation by our CHF colleagues--I would prefer to have her better optimized before such visit is scheduled. I have discussed all of our impressions and recommendations in extensive detail and answered all of her questions. I will notify Jaime Robert and Jae of the plan as well.

## 2022-02-10 NOTE — PHYSICAL EXAM
[Well Developed] : well developed [Normal Rate] : normal [Rhythm Regular] : regular [No Pitting Edema] : no pitting edema present [Clear Lung Fields] : clear lung fields [No Edema] : no edema [Normal] : alert and oriented, normal memory [No Carotid Bruit] : no carotid bruit [Prosthetic Aortic Valve] : prosthetic aortic valve heard [I] : a grade 1 [Right Carotid Bruit] : no bruit heard over the right carotid [Left Carotid Bruit] : no bruit heard over the left carotid [de-identified] : no JVD

## 2022-03-14 ENCOUNTER — APPOINTMENT (OUTPATIENT)
Dept: CARDIOTHORACIC SURGERY | Facility: CLINIC | Age: 57
End: 2022-03-14
Payer: COMMERCIAL

## 2022-03-14 VITALS
BODY MASS INDEX: 22.63 KG/M2 | OXYGEN SATURATION: 98 % | HEART RATE: 71 BPM | WEIGHT: 123 LBS | RESPIRATION RATE: 16 BRPM | DIASTOLIC BLOOD PRESSURE: 86 MMHG | SYSTOLIC BLOOD PRESSURE: 138 MMHG | HEIGHT: 62 IN

## 2022-03-14 PROCEDURE — 99214 OFFICE O/P EST MOD 30 MIN: CPT

## 2022-03-14 RX ORDER — TIZANIDINE 4 MG/1
4 TABLET ORAL
Qty: 180 | Refills: 0 | Status: ACTIVE | COMMUNITY
Start: 2022-03-03

## 2022-03-14 RX ORDER — DILTIAZEM HYDROCHLORIDE 120 MG/1
120 CAPSULE, EXTENDED RELEASE ORAL
Qty: 90 | Refills: 3 | Status: ACTIVE | COMMUNITY
Start: 1900-01-01 | End: 1900-01-01

## 2022-03-14 NOTE — HISTORY OF PRESENT ILLNESS
[FreeTextEntry1] : Ms. Cabral returns to clinic today for follow up evaluation of symptomatic severe tricuspid regurgitation. She was initially seen by our team on 2/10/22, at which time she was volume up and hypertensive. She was started on Furosemide and advised to return today for reassessment and discussion of transcatheter treatment options for her TR. \par \par Since her last visit she notes that she feels "calmer" than before. She still notes having dyspnea on exertion, such as with climbing one flight of stairs and walking one block. She is fatigued and her activity tolerance has declined. She has no angina, PND, orthopnea, dizziness, syncope, or edema. She held her furosemide this morning because she is away from home. \par \par As previously noted, her TV anatomy (and TR location between the P and S leaflets) is suboptimal for transcatheter edge to edge repair (KATHARINE), which we are doing as part of the TRILUMINATE Pivotal Trial. We expect to be activated and able to enroll patients shortly in the TRISCEND II trial, also for patients with symptomatic severe TR, using the Feilz EVOQUE valve for transcatheter tricuspid valve replacement (TTVR). \par \par She reports since starting Lasix "my blood pressure went down a little bit and I am calmer" but she reports still feeling fatigued and becomes dyspneic with climbing a FOS, as noted above. She notes "I can't walk as fast as I used to" for several months. She looks well today in the office.\par

## 2022-03-14 NOTE — PHYSICAL EXAM
[Well Developed] : well developed [Well Nourished] : well nourished [Normal Conjunctiva] : normal conjunctiva [Normal Rate] : normal [Rhythm Regular] : regular [II] : a grade 2 [No Pitting Edema] : no pitting edema present [Clear Lung Fields] : clear lung fields [Soft] : abdomen soft [Non Tender] : non-tender [Normal Gait] : normal gait [No Edema] : no edema [Normal] : alert and oriented, normal memory [Prosthetic Aortic Valve] : prosthetic aortic valve heard [Right Carotid Bruit] : no bruit heard over the right carotid [Left Carotid Bruit] : no bruit heard over the left carotid [de-identified] : No JVD

## 2022-03-14 NOTE — DISCUSSION/SUMMARY
[FreeTextEntry1] : Lesvia has symptomatic severe tricuspid regurgitation with fatigue and dyspnea on exertion (NYHA II). I see an improvement since starting Lasix--her JVD is resolved. She has a soft IVÁN consistent with her bioprosthetic AVR. Her BP remains slightly above goal and I am restarting her CCB at half the prior dose--she is unsure why it was stopped (during the admission when she had a MICRA AV PPM implanted), and I suspect it may have been when her pacing lead was found to be failing (and never restarted). She would like to be considered for the TTVR trial (TRISCEND II) that we expect to be starting shortly (awaiting FDA approval of another protocol revision), so I will see her back in 6 weeks to see how she is doing on the CCB, further optimize her regimen as indicated, and hopefully be enrolling in the study at that time.

## 2022-03-14 NOTE — REVIEW OF SYSTEMS
[Feeling Fatigued] : feeling fatigued [Dyspnea on exertion] : dyspnea during exertion [Negative] : Heme/Lymph [Fever] : no fever [Chills] : no chills [Chest Discomfort] : no chest discomfort [Lower Ext Edema] : no extremity edema [Palpitations] : no palpitations [Orthopnea] : no orthopnea [PND] : no PND [Abdominal Pain] : no abdominal pain [Change in Appetite] : no change in appetite [Dizziness] : no dizziness [FreeTextEntry4] : awaiting dental filling

## 2022-03-14 NOTE — CARDIOLOGY SUMMARY
[de-identified] : None today, prior paced rhythm [de-identified] : 11/04/21\par EF 55-60%, LAYNE 0.8 sqcm, mGr 21 mmHg, PGr 41 mmHg, AoV 3.2, DVI 0.27, mild MR, severe TR  [de-identified] : 11/04/21\par EF 55-60%, LAYNE 0.8 sqcm, mGr 21 mmHg, PGr 41 mmHg, AoV 3.2, DVI 0.27, mild MR, severe TR

## 2022-04-25 ENCOUNTER — APPOINTMENT (OUTPATIENT)
Dept: CARDIOTHORACIC SURGERY | Facility: CLINIC | Age: 57
End: 2022-04-25

## 2022-09-01 ENCOUNTER — APPOINTMENT (OUTPATIENT)
Dept: SURGICAL ONCOLOGY | Facility: CLINIC | Age: 57
End: 2022-09-01

## 2022-09-01 VITALS
BODY MASS INDEX: 21.35 KG/M2 | OXYGEN SATURATION: 97 % | DIASTOLIC BLOOD PRESSURE: 84 MMHG | HEART RATE: 77 BPM | RESPIRATION RATE: 17 BRPM | WEIGHT: 116 LBS | HEIGHT: 62 IN | SYSTOLIC BLOOD PRESSURE: 124 MMHG

## 2022-09-01 PROCEDURE — 99214 OFFICE O/P EST MOD 30 MIN: CPT

## 2022-09-01 NOTE — REASON FOR VISIT
[Follow-Up Visit] : a follow-up visit for [Other: _____] : [unfilled] [FreeTextEntry2] : Annual breast exam, Sangeetha risk score = 39

## 2022-09-01 NOTE — ASSESSMENT
[FreeTextEntry1] : MRI contraindicated because of her pacemaker.\par \par August 2021 breast imaging at [facility]: BI-RADS 2\par Follow-up is scheduled for September 2022, she has a prescription\par \par Clinically doing well.\par \par \par We will see her annually, sooner if needed

## 2022-09-01 NOTE — HISTORY OF PRESENT ILLNESS
[de-identified] : Maria Elena Armstrong's sister\par \par 57 year-old lady with a history of RIGHT BREAST ATYPIA and radial scar excised in 2007.\par \par + Tamoxifen for risk reduction, until it was stopped because she had a TIA.\par \par \par Today she is asymptomatic.\par \par \par 2010, right breast MRI core biopsy was benign and concordant.\par \par 2010 she had a left duct excision.\par \par Due to insurance reasons, she transiently saw my associate JW.  (–), \par \par \par +FH:\par His sister had breast cancer at 32.\par Another sister had ovarian cancer.\par \par Additional family history of malignancy:\par Mother: Myeloma.\par Paternal grandmother: Colon cancer.\par Paternal aunt: Colon cancer.\par Paternal cousin: Colon cancer.\par \par \par Genetic testing: NO deleterious mutation.\par \par \par Menarche at 12.\par  3, para 2, first birth at 21.\par Natural menopause at 50.\par No HRT\par \par \par Sangeetha score 39.\par \par \par Breast MRI contraindicated due to PACEMAKER\par \par  she had an aortic valve replacement with a bovine prosthesis at [facility].\par Postoperatively she required a pacemaker placement.\par This was revised to a wireless pacemaker 2022 at [facility].\par May 2022 she had tricuspid valve repair at [facility] with damage done to the valve from the original pacemaker lead.\par \par She takes aspirin daily.\par \par \par Her internist is Dr.Kamal SWAN\par Was Dr Greg Ackerman, then Dr Cristina Rojas.\par \par + Pacemaker.\par No anticoagulants.\par + Daily aspirin.\par \par Cardizem for hypertension.\par Her cardiologist is Dr. Fernando SMITH\par \par Known benign thyroid nodules.\par 2020 FNA biopsy was benign.\par Endocrine: Dr. Benedict SIMON\par \par + Rheumatoid arthritis.\par Meloxicam and ibuprofen.\par Rheumatology: Dr. Pepito CARBAJAL.\par She also has scleroderma.\par She was transiently treated with hydroxychloroquine, which she had to stop because of heartburn.\par \par \par Her gynecologist is Dr. Osiel HAMM, 2020 was normal\par \par \par Spring 2019 colonoscopy: Dr. Krish Corral.\par Follow-up is pending

## 2022-09-01 NOTE — REVIEW OF SYSTEMS
[Negative] : Integumentary [FreeTextEntry5] : Pacemaker [FreeTextEntry7] : Scleroderma [de-identified] : Rheumatoid arthritis [de-identified] : Benign thyroid nodules [FreeTextEntry1] : Increased risk of breast cancer

## 2022-11-01 ENCOUNTER — NON-APPOINTMENT (OUTPATIENT)
Age: 57
End: 2022-11-01

## 2022-11-19 ENCOUNTER — OFFICE (OUTPATIENT)
Dept: URBAN - METROPOLITAN AREA CLINIC 12 | Facility: CLINIC | Age: 57
Setting detail: OPHTHALMOLOGY
End: 2022-11-19
Payer: COMMERCIAL

## 2022-11-19 DIAGNOSIS — H25.13: ICD-10-CM

## 2022-11-19 DIAGNOSIS — H16.223: ICD-10-CM

## 2022-11-19 DIAGNOSIS — H43.393: ICD-10-CM

## 2022-11-19 PROBLEM — H52.7 REFRACTIVE ERROR: Status: ACTIVE | Noted: 2022-11-19

## 2022-11-19 PROCEDURE — 92014 COMPRE OPH EXAM EST PT 1/>: CPT | Performed by: OPHTHALMOLOGY

## 2022-11-19 ASSESSMENT — KERATOMETRY
METHOD_AUTO_MANUAL: AUTO
OD_AXISANGLE_DEGREES: 077
OD_K2POWER_DIOPTERS: 44.75
OS_K1POWER_DIOPTERS: 44.00
OS_AXISANGLE_DEGREES: 101
OD_K1POWER_DIOPTERS: 44.00
OS_K2POWER_DIOPTERS: 44.50

## 2022-11-19 ASSESSMENT — REFRACTION_AUTOREFRACTION
OD_SPHERE: +0.75
OS_SPHERE: PLANO
OD_CYLINDER: -0.25
OS_AXIS: 041
OD_AXIS: 179
OS_CYLINDER: -0.25

## 2022-11-19 ASSESSMENT — REFRACTION_MANIFEST
OS_AXIS: 000
OS_AXIS: 180
OS_ADD: +2.00
OD_AXIS: 015
OS_VA1: 20/20
OD_VA1: 20/25
OD_VA1: 20/20
OD_ADD: +2.25
OS_ADD: +2.25
OD_CYLINDER: -0.50
OS_CYLINDER: SPHERE
OD_SPHERE: +0.50
OD_SPHERE: PLANO
OS_SPHERE: -0.50
OS_CYLINDER: -0.25
OS_VA1: 20/20
OS_SPHERE: -0.50
OD_ADD: +2.00
OD_CYLINDER: SPH

## 2022-11-19 ASSESSMENT — VISUAL ACUITY
OS_BCVA: 20/20-1
OD_BCVA: 20/20

## 2022-11-19 ASSESSMENT — REFRACTION_CURRENTRX
OD_SPHERE: +2.00
OD_OVR_VA: 20/
OS_OVR_VA: 20/
OS_SPHERE: +2.00
OS_VPRISM_DIRECTION: SV
OD_VPRISM_DIRECTION: SV

## 2022-11-19 ASSESSMENT — SPHEQUIV_DERIVED
OD_SPHEQUIV: 0.25
OD_SPHEQUIV: 0.625
OS_SPHEQUIV: -0.625

## 2022-11-19 ASSESSMENT — AXIALLENGTH_DERIVED
OD_AL: 23.0402
OD_AL: 23.1804
OS_AL: 23.56

## 2022-11-19 ASSESSMENT — CONFRONTATIONAL VISUAL FIELD TEST (CVF)
OD_FINDINGS: FULL
OS_FINDINGS: FULL

## 2022-11-19 ASSESSMENT — SUPERFICIAL PUNCTATE KERATITIS (SPK)
OS_SPK: T
OD_SPK: T

## 2022-11-19 ASSESSMENT — TONOMETRY
OD_IOP_MMHG: 15
OS_IOP_MMHG: 17

## 2023-09-07 ENCOUNTER — APPOINTMENT (OUTPATIENT)
Dept: SURGICAL ONCOLOGY | Facility: CLINIC | Age: 58
End: 2023-09-07
Payer: COMMERCIAL

## 2023-09-07 VITALS
DIASTOLIC BLOOD PRESSURE: 80 MMHG | WEIGHT: 120 LBS | RESPIRATION RATE: 16 BRPM | HEART RATE: 83 BPM | HEIGHT: 62 IN | SYSTOLIC BLOOD PRESSURE: 115 MMHG | BODY MASS INDEX: 22.08 KG/M2 | OXYGEN SATURATION: 99 %

## 2023-09-07 DIAGNOSIS — Z91.89 OTHER SPECIFIED PERSONAL RISK FACTORS, NOT ELSEWHERE CLASSIFIED: ICD-10-CM

## 2023-09-07 PROCEDURE — 99215 OFFICE O/P EST HI 40 MIN: CPT

## 2023-09-07 NOTE — REVIEW OF SYSTEMS
[Negative] : Integumentary [FreeTextEntry5] : Pacemaker [FreeTextEntry7] : Rheumatoid arthritis [de-identified] : Thyroid nodules [FreeTextEntry1] : Increased risk of breast cancer

## 2023-09-07 NOTE — HISTORY OF PRESENT ILLNESS
[de-identified] : Maria Elena Armstrong's sister  58 year-old lady with a history of RIGHT BREAST ATYPIA and radial scar excised in 2007.  + Tamoxifen for risk reduction, until it was stopped because she had a TIA.   Today she is asymptomatic.   2010, right breast MRI core biopsy was benign and concordant.  2010: Left duct excision.  Due to insurance reasons, she transiently saw my associate JW.  ([phone]),    +FH: His sister had breast cancer at 32. Another sister had ovarian cancer.  Additional family history of malignancy: Mother: Myeloma. Paternal grandmother: Colon cancer. Paternal aunt: Colon cancer. Paternal cousin: Colon cancer.   Genetic testing: 2023: Through her gynecologist: Deleterious mutation in MUYTH, which is associated with an increased risk of colorectal cancer.   Menarche at 12.  3, para 2, first birth at 21. Natural menopause at 50. No HRT   Sangeetha score 39.   Breast MRI contraindicated due to PACEMAKER   she had an aortic valve replacement with a bovine prosthesis at [facility]. Postoperatively she required a pacemaker placement. This was revised to a wireless pacemaker 2022 at [facility]. May 2022: Tricuspid valve repair at [place] with damage done to the valve from the original pacemaker lead.  She takes aspirin daily.   PMD: Dr.Kamal SWAN Was Dr Greg Ackerman, then Dr Cristina Rojas.  + PACEMAKER. No anticoagulants.  + Daily aspirin.  +POLYCYTEMIA VERA. Hematology: Dr. Shalini VASQUEZ. She requires occasional phlebotomies, but no other treatments  Metoprolol and Lasix for hypertension. Her cardiologist is Dr. Fernando SMITH  Known benign thyroid nodules. 2020 FNA biopsy was benign. Endocrine: Dr. Benedict SIMON  + Rheumatoid arthritis. Meloxicam and ibuprofen. Rheumatology: Dr. Pepito CARBAJAL. She also has scleroderma. She was transiently treated with hydroxychloroquine, which she had to stop because of heartburn.  + Arthritis in both hands. Orthopedics: Dr. Latisha MASCORRO. She received steroid injections in her hands from the physicians assistant (Chaya) at [facility].   Her gynecologist is Dr. Osiel HAMM, 2022 was normal   Colonoscopy: Dr. August ARELLANO. He used to see Dr. Krish Corral. 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

## 2023-09-07 NOTE — REASON FOR VISIT
[Follow-Up Visit] : a follow-up visit for [Other: _____] : [unfilled] [FreeTextEntry2] : Annual breast exam, history of right breast atypia, Sangeetha risk score = 39, patient has a pacemaker.

## 2023-09-07 NOTE — PHYSICAL EXAM
[Normal] : supple, no neck mass and thyroid not enlarged [Normal Neck Lymph Nodes] : normal neck lymph nodes  [Normal Supraclavicular Lymph Nodes] : normal supraclavicular lymph nodes [Normal Axillary Lymph Nodes] : normal axillary lymph nodes [Normal] : normal appearance, no rash, nodules, vesicles, ulcers, erythema [de-identified] : Groins not examined [de-identified] : See diagram

## 2023-09-07 NOTE — ASSESSMENT
[FreeTextEntry1] : MRI contraindicated because of her pacemaker.  October 2022: Breast imaging at [facility]: BI-RADS 2 Follow-up: Due in October 2023, prescription provided  Clinically doing well.   We will see her annually, sooner if needed

## 2023-09-14 ENCOUNTER — NON-APPOINTMENT (OUTPATIENT)
Age: 58
End: 2023-09-14

## 2024-03-27 ENCOUNTER — NON-APPOINTMENT (OUTPATIENT)
Age: 59
End: 2024-03-27
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
## 2024-05-31 NOTE — PRE-ANESTHESIA EVALUATION ADULT - BMI (KG/M2)
Patient's right internal jugular temporary dialysis catheter site oozing, and entire dressing is saturated upon arrival to IR suite.  22.5

## 2024-08-19 ENCOUNTER — APPOINTMENT (OUTPATIENT)
Dept: SURGICAL ONCOLOGY | Facility: CLINIC | Age: 59
End: 2024-08-19
Payer: COMMERCIAL

## 2024-08-19 VITALS
HEIGHT: 62 IN | SYSTOLIC BLOOD PRESSURE: 105 MMHG | HEART RATE: 86 BPM | BODY MASS INDEX: 22.63 KG/M2 | WEIGHT: 123 LBS | DIASTOLIC BLOOD PRESSURE: 72 MMHG | OXYGEN SATURATION: 97 % | RESPIRATION RATE: 16 BRPM

## 2024-08-19 DIAGNOSIS — Z91.89 OTHER SPECIFIED PERSONAL RISK FACTORS, NOT ELSEWHERE CLASSIFIED: ICD-10-CM

## 2024-08-19 PROCEDURE — 99214 OFFICE O/P EST MOD 30 MIN: CPT

## 2024-08-19 NOTE — REVIEW OF SYSTEMS
[Negative] : Genitourinary [FreeTextEntry4] : Seasonal allergies [FreeTextEntry5] : Pacemaker [FreeTextEntry7] : NKDA [FreeTextEntry8] : Genetic predisposition to CRC [de-identified] : Arthritis [de-identified] : Scleroderma [de-identified] : Thyroid nodules [FreeTextEntry1] : Increased risk of breast cancer

## 2024-08-19 NOTE — REVIEW OF SYSTEMS
[Negative] : Genitourinary [FreeTextEntry4] : Seasonal allergies [FreeTextEntry5] : Pacemaker [FreeTextEntry7] : NKDA [FreeTextEntry8] : Genetic predisposition to CRC [de-identified] : Arthritis [de-identified] : Scleroderma [de-identified] : Thyroid nodules [FreeTextEntry1] : Increased risk of breast cancer

## 2024-08-19 NOTE — HISTORY OF PRESENT ILLNESS
[de-identified] : Maria Elena Armstrong's sister  59-year-old lady.  Sangeetha risk score = 39.  Breast MRI contraindicated due to PACEMAKER.  Annual breast examination.  CC: Today she is asymptomatic.   History of RIGHT BREAST ATYPIA and radial scar excised in 2007.  + Tamoxifen for risk reduction, until it was stopped because she had a TIA.  + Additional personal history: 2010, right breast MRI core biopsy was benign and concordant. 2010: Left duct excision.  Due to insurance reasons, she transiently saw my associate JW.  ([phone]),   No personal history of malignancy.   +FH: His sister had breast cancer at 32. A different sister had ovarian cancer.  Additional family history of malignancy: Mother: Myeloma. Paternal grandmother: Colon cancer. Paternal aunt: Colon cancer. Paternal cousin: Colon cancer.   Genetic testing: 2023: Through her gynecologist: Deleterious mutation in MUYTH, which is associated with an increased risk of colorectal cancer.   Menarche at 12.  3, para 2, first birth at 21. Natural menopause at 50. No HRT.     PMD: Dr. Taty SWAN Was Dr Greg Ackerman, then Dr Cristina Rojas.  NKDA. + Seasonal allergies.  + PACEMAKER. No anticoagulants.  + Daily aspirin.   she had an aortic valve replacement with a bovine prosthesis at [facility]. Postoperatively she required a pacemaker placement. This was revised to a wireless pacemaker 2022 at [facility]. May 2022: Tricuspid valve repair at [place] with damage done to the valve from the original pacemaker lead.  +POLYCYTEMIA VERA. Hematology: Dr. Shalini VASQUEZ. She requires occasional phlebotomies, but no other treatments.  Metoprolol and Lasix for hypertension. Her cardiologist is Dr. Fernando SMITH.  Known benign thyroid nodules. 2020 FNA biopsy was benign. Endocrine: Dr. Benedict SIMON.  + Rheumatoid arthritis. Meloxicam and ibuprofen. Rheumatology: Dr. Pepito CARBAJAL. She also has scleroderma. She was transiently treated with hydroxychloroquine, which she had to stop because of heartburn.  + Arthritis in both hands. Orthopedics: Dr. Latisha MASCORRO. She received steroid injections in her hands from the physician's assistant (Chaya) at [facility].   Her gynecologist is Dr. Osiel HAMM, 2022 was normal. He retired in . She now sees a gynecologist at [facility],  visit was unremarkable, but no other details are available   Colonoscopy: Dr. August ARELLANO. He used to see Dr. Krish Corral. 2023 okay x3 years.  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

## 2024-08-19 NOTE — PHYSICAL EXAM
[Normal] : supple, no neck mass and thyroid not enlarged [Normal Neck Lymph Nodes] : normal neck lymph nodes  [Normal Supraclavicular Lymph Nodes] : normal supraclavicular lymph nodes [Normal Axillary Lymph Nodes] : normal axillary lymph nodes [Normal] : normal appearance, no rash, nodules, vesicles, ulcers, erythema [de-identified] : Groins not examined [de-identified] : See diagram

## 2024-08-19 NOTE — HISTORY OF PRESENT ILLNESS
[de-identified] : Maria Elena Armstrong's sister  59-year-old lady.  Sangeetha risk score = 39.  Breast MRI contraindicated due to PACEMAKER.  Annual breast examination.  CC: Today she is asymptomatic.   History of RIGHT BREAST ATYPIA and radial scar excised in 2007.  + Tamoxifen for risk reduction, until it was stopped because she had a TIA.  + Additional personal history: 2010, right breast MRI core biopsy was benign and concordant. 2010: Left duct excision.  Due to insurance reasons, she transiently saw my associate JW.  ([phone]),   No personal history of malignancy.   +FH: His sister had breast cancer at 32. A different sister had ovarian cancer.  Additional family history of malignancy: Mother: Myeloma. Paternal grandmother: Colon cancer. Paternal aunt: Colon cancer. Paternal cousin: Colon cancer.   Genetic testing: 2023: Through her gynecologist: Deleterious mutation in MUYTH, which is associated with an increased risk of colorectal cancer.   Menarche at 12.  3, para 2, first birth at 21. Natural menopause at 50. No HRT.     PMD: Dr. Taty SWAN Was Dr Greg Ackerman, then Dr Cristina Rojas.  NKDA. + Seasonal allergies.  + PACEMAKER. No anticoagulants.  + Daily aspirin.   she had an aortic valve replacement with a bovine prosthesis at [facility]. Postoperatively she required a pacemaker placement. This was revised to a wireless pacemaker 2022 at [facility]. May 2022: Tricuspid valve repair at [place] with damage done to the valve from the original pacemaker lead.  +POLYCYTEMIA VERA. Hematology: Dr. Shalini VASQUEZ. She requires occasional phlebotomies, but no other treatments.  Metoprolol and Lasix for hypertension. Her cardiologist is Dr. Fernando SMITH.  Known benign thyroid nodules. 2020 FNA biopsy was benign. Endocrine: Dr. Benedict SIMON.  + Rheumatoid arthritis. Meloxicam and ibuprofen. Rheumatology: Dr. Pepito CARBAJAL. She also has scleroderma. She was transiently treated with hydroxychloroquine, which she had to stop because of heartburn.  + Arthritis in both hands. Orthopedics: Dr. Latisha MASCORRO. She received steroid injections in her hands from the physician's assistant (Chaya) at [facility].   Her gynecologist is Dr. Osiel HAMM, 2022 was normal. He retired in . She now sees a gynecologist at [facility],  visit was unremarkable, but no other details are available   Colonoscopy: Dr. August ARELLANO. He used to see Dr. Krish Corral. 2023 okay x3 years.  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

## 2024-08-19 NOTE — ASSESSMENT
[FreeTextEntry1] : 59-year-old lady.  History of right breast atypia and radial scar.  Sangeetha risk score = 39.  MRI contraindicated because of her pacemaker.  10/21/2023: Breast imaging at [facility]: BI-RADS 2 Follow-up: Due in October 2024, prescription provided.  Clinically doing well.  If no problems, have offered to continue to see her annually, sooner if needed.

## 2024-08-19 NOTE — PHYSICAL EXAM
[Normal] : supple, no neck mass and thyroid not enlarged [Normal Neck Lymph Nodes] : normal neck lymph nodes  [Normal Supraclavicular Lymph Nodes] : normal supraclavicular lymph nodes [Normal Axillary Lymph Nodes] : normal axillary lymph nodes [Normal] : normal appearance, no rash, nodules, vesicles, ulcers, erythema [de-identified] : Groins not examined [de-identified] : See diagram

## 2024-08-19 NOTE — REASON FOR VISIT
[Follow-Up Visit] : a follow-up visit for [Other: _____] : [unfilled] [FreeTextEntry2] : Annual breast examination, history of right breast atypia and radial scar, Sangeetha risk score = 39.

## 2024-08-19 NOTE — PHYSICAL EXAM
[Normal] : supple, no neck mass and thyroid not enlarged [Normal Neck Lymph Nodes] : normal neck lymph nodes  [Normal Supraclavicular Lymph Nodes] : normal supraclavicular lymph nodes [Normal Axillary Lymph Nodes] : normal axillary lymph nodes [Normal] : normal appearance, no rash, nodules, vesicles, ulcers, erythema [de-identified] : Groins not examined [de-identified] : See diagram

## 2024-08-19 NOTE — HISTORY OF PRESENT ILLNESS
[de-identified] : Maria Elena Armstrong's sister  59-year-old lady.  Sangeetha risk score = 39.  Breast MRI contraindicated due to PACEMAKER.  Annual breast examination.  CC: Today she is asymptomatic.   History of RIGHT BREAST ATYPIA and radial scar excised in 2007.  + Tamoxifen for risk reduction, until it was stopped because she had a TIA.  + Additional personal history: 2010, right breast MRI core biopsy was benign and concordant. 2010: Left duct excision.  Due to insurance reasons, she transiently saw my associate JW.  ([phone]),   No personal history of malignancy.   +FH: His sister had breast cancer at 32. A different sister had ovarian cancer.  Additional family history of malignancy: Mother: Myeloma. Paternal grandmother: Colon cancer. Paternal aunt: Colon cancer. Paternal cousin: Colon cancer.   Genetic testing: 2023: Through her gynecologist: Deleterious mutation in MUYTH, which is associated with an increased risk of colorectal cancer.   Menarche at 12.  3, para 2, first birth at 21. Natural menopause at 50. No HRT.     PMD: Dr. Taty SWAN Was Dr Greg Ackerman, then Dr Cristina Rojas.  NKDA. + Seasonal allergies.  + PACEMAKER. No anticoagulants.  + Daily aspirin.   she had an aortic valve replacement with a bovine prosthesis at [facility]. Postoperatively she required a pacemaker placement. This was revised to a wireless pacemaker 2022 at [facility]. May 2022: Tricuspid valve repair at [place] with damage done to the valve from the original pacemaker lead.  +POLYCYTEMIA VERA. Hematology: Dr. Shalini VASQUEZ. She requires occasional phlebotomies, but no other treatments.  Metoprolol and Lasix for hypertension. Her cardiologist is Dr. Fernando SMITH.  Known benign thyroid nodules. 2020 FNA biopsy was benign. Endocrine: Dr. Benedict SIMON.  + Rheumatoid arthritis. Meloxicam and ibuprofen. Rheumatology: Dr. Pepito CARBAJAL. She also has scleroderma. She was transiently treated with hydroxychloroquine, which she had to stop because of heartburn.  + Arthritis in both hands. Orthopedics: Dr. Latisha MASCORRO. She received steroid injections in her hands from the physician's assistant (Chaya) at [facility].   Her gynecologist is Dr. Osiel HAMM, 2022 was normal. He retired in . She now sees a gynecologist at [facility],  visit was unremarkable, but no other details are available   Colonoscopy: Dr. August ARELLANO. He used to see Dr. Krish Corral. 2023 okay x3 years.  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

## 2024-09-11 PROBLEM — H02.89 FLOPPY LID SYNDROME ; BOTH EYES: Status: ACTIVE | Noted: 2024-09-11

## 2024-09-11 PROBLEM — H25.13 CATARACT NUCLEAR SCLEROSIS ; BOTH EYES: Status: ACTIVE | Noted: 2024-09-11

## 2024-09-11 PROBLEM — H40.033 ANATOMICAL NARROW ANGLE; BOTH EYES: Status: ACTIVE | Noted: 2024-09-11

## 2024-12-11 PROBLEM — R92.8 ABNORMAL FINDING ON BREAST IMAGING: Status: ACTIVE | Noted: 2024-12-11

## (undated) DEVICE — BLADE SCALPEL SAFETYLOCK #11

## (undated) DEVICE — GLV 7.5 PROTEXIS (WHITE)

## (undated) DEVICE — DRAPE 1/2 SHEET 40X57"

## (undated) DEVICE — SPECIMEN CONTAINER 100ML

## (undated) DEVICE — SUT PLEDGET PRE PUNCH 4.8 X 9.5 X 1.5 MM

## (undated) DEVICE — GLV 7 PROTEXIS (WHITE)

## (undated) DEVICE — BLADE SCALPEL SAFETYLOCK #10

## (undated) DEVICE — FOLEY TRAY 16FR 5CC LF LUBRISIL ADVANCE TEMP CLOSED

## (undated) DEVICE — GLV 8.5 PROTEXIS (WHITE)

## (undated) DEVICE — DRSG DERMABOND PRINEO 60CM

## (undated) DEVICE — KIT MEDTRONIC WRENCH CARD LEAD DISP

## (undated) DEVICE — SUT SOFSILK 2-0 18" TIES

## (undated) DEVICE — DEFIBRILLATOR PAD PRE-CONNECT ADULT/CHILD

## (undated) DEVICE — SUT SURGICAL STEEL 6 30" BP-1

## (undated) DEVICE — POSITIONER FOAM EGG CRATE ULNAR 2PCS (PINK)

## (undated) DEVICE — ADAPTER ROT NUT M/M

## (undated) DEVICE — SUT BLUNT SZ 5

## (undated) DEVICE — TUBING BRAT 2 SUCTION ASSEMBLY TWIST LOCK

## (undated) DEVICE — NDL HYPO REGULAR BEVEL 22G X 1.5" (TURQUOISE)

## (undated) DEVICE — SOL NORMOSOL-R PH7.4 1000ML

## (undated) DEVICE — DRAPE 3/4 SHEET W REINFORCEMENT 56X77"

## (undated) DEVICE — DRAPE IOBAN 23" X 23"

## (undated) DEVICE — DILATOR VESSEL 18FR

## (undated) DEVICE — STEALTH CLAMP INSERT FIBRA/FIBRA 90MM

## (undated) DEVICE — DRAPE TOWEL BLUE 17" X 24"

## (undated) DEVICE — SUT DOUBLE 6 WIRE STERNAL

## (undated) DEVICE — GLV 6.5 PROTEXIS (WHITE)

## (undated) DEVICE — SUT SOFSILK 0 30" V-20

## (undated) DEVICE — SLV STER PROGRAM WAND

## (undated) DEVICE — DRAPE MAYO STAND 30"

## (undated) DEVICE — SPIKE INJ SITE

## (undated) DEVICE — WARMING BLANKET DUO-THERM HYPER/HYPOTHERM ADULT

## (undated) DEVICE — SUT ETHIBOND 0 44" EN3

## (undated) DEVICE — LAP PAD 18 X 18"

## (undated) DEVICE — GLV 8 PROTEXIS (WHITE)

## (undated) DEVICE — PACK UNIVERSAL CARDIAC

## (undated) DEVICE — DRAPE INSTRUMENT POUCH 6.75" X 11"

## (undated) DEVICE — VISITEC 4X4

## (undated) DEVICE — SOL IRR POUR H2O 250ML

## (undated) DEVICE — SUT STAINLESS STEEL 5 18" SCC

## (undated) DEVICE — BLADE SCALPEL SAFETYLOCK #15

## (undated) DEVICE — SUT BIOSYN 4-0 18" P-12

## (undated) DEVICE — SUT SOFSILK 0 18" TIES

## (undated) DEVICE — DRSG OPSITE 13.75 X 4"

## (undated) DEVICE — KIT LEAD ACCESSORY

## (undated) DEVICE — SUT POLYSORB 2-0 30" GS-21 UNDYED

## (undated) DEVICE — SYR ASEPTO

## (undated) DEVICE — DILATOR 12FR

## (undated) DEVICE — SUT POLYSORB 0 36" GS-25 UNDYED

## (undated) DEVICE — STAPLER SKIN VISI-STAT 35 WIDE

## (undated) DEVICE — DRAPE C ARM UNIVERSAL

## (undated) DEVICE — WARMING BLANKET FULL UNDERBODY

## (undated) DEVICE — PREP CHLORAPREP HI-LITE ORANGE 26ML

## (undated) DEVICE — NDL HYPO SAFE 25G X 5/8" (ORANGE)

## (undated) DEVICE — SUCTION YANKAUER NO CONTROL VENT

## (undated) DEVICE — SAW BLADE MICROAIRE STERNUM 1X34X9.4MM

## (undated) DEVICE — MNFLD SETUP

## (undated) DEVICE — MEDICATION LABELS W MARKER

## (undated) DEVICE — SYR LUER LOK 10CC

## (undated) DEVICE — SPIKE MINI STER

## (undated) DEVICE — STEALTH CLAMP INSERT FIBRA/FIBRA 60MM

## (undated) DEVICE — SOL IRR POUR NS 0.9% 500ML

## (undated) DEVICE — VENODYNE/SCD SLEEVE CALF LARGE